# Patient Record
Sex: MALE | Race: BLACK OR AFRICAN AMERICAN | NOT HISPANIC OR LATINO | Employment: PART TIME | URBAN - METROPOLITAN AREA
[De-identification: names, ages, dates, MRNs, and addresses within clinical notes are randomized per-mention and may not be internally consistent; named-entity substitution may affect disease eponyms.]

---

## 2024-10-22 ENCOUNTER — HOSPITAL ENCOUNTER (OUTPATIENT)
Facility: HOSPITAL | Age: 24
Discharge: HOME | End: 2024-10-24
Attending: EMERGENCY MEDICINE | Admitting: INTERNAL MEDICINE
Payer: MEDICAID

## 2024-10-22 DIAGNOSIS — S66.921A HAND LACERATION INVOLVING TENDON, RIGHT, INITIAL ENCOUNTER: ICD-10-CM

## 2024-10-22 DIAGNOSIS — T14.90XA TRAUMA: ICD-10-CM

## 2024-10-22 DIAGNOSIS — M25.572 LEFT ANKLE PAIN: ICD-10-CM

## 2024-10-22 DIAGNOSIS — T14.8XXA OPEN FRACTURE: ICD-10-CM

## 2024-10-22 DIAGNOSIS — S62.231B: Primary | ICD-10-CM

## 2024-10-22 DIAGNOSIS — S61.411A HAND LACERATION INVOLVING TENDON, RIGHT, INITIAL ENCOUNTER: ICD-10-CM

## 2024-10-22 DIAGNOSIS — M25.512 LEFT SHOULDER PAIN: ICD-10-CM

## 2024-10-22 DIAGNOSIS — S92.255A CLOSED NONDISPLACED FRACTURE OF NAVICULAR BONE OF LEFT FOOT, INITIAL ENCOUNTER: ICD-10-CM

## 2024-10-22 DIAGNOSIS — T07.XXXA ABRASIONS OF MULTIPLE SITES: ICD-10-CM

## 2024-10-22 LAB
ABO + RH BLD: NORMAL
ALBUMIN SERPL BCP-MCNC: 4.2 G/DL (ref 3.5–5.2)
ALP SERPL-CCNC: 135 U/L (ref 40–150)
ALT SERPL W/O P-5'-P-CCNC: 33 U/L (ref 10–44)
ANION GAP SERPL CALC-SCNC: 10 MMOL/L (ref 8–16)
AST SERPL-CCNC: 27 U/L (ref 10–40)
BASOPHILS # BLD AUTO: 0.02 K/UL (ref 0–0.2)
BASOPHILS NFR BLD: 0.1 % (ref 0–1.9)
BILIRUB SERPL-MCNC: 0.5 MG/DL (ref 0.1–1)
BILIRUB UR QL STRIP: NEGATIVE
BLD GP AB SCN CELLS X3 SERPL QL: NORMAL
BUN SERPL-MCNC: 11 MG/DL (ref 6–20)
CALCIUM SERPL-MCNC: 9.7 MG/DL (ref 8.7–10.5)
CHLORIDE SERPL-SCNC: 104 MMOL/L (ref 95–110)
CLARITY UR: CLEAR
CO2 SERPL-SCNC: 25 MMOL/L (ref 23–29)
COLOR UR: YELLOW
CREAT SERPL-MCNC: 0.9 MG/DL (ref 0.5–1.4)
DIFFERENTIAL METHOD BLD: ABNORMAL
EOSINOPHIL # BLD AUTO: 0.1 K/UL (ref 0–0.5)
EOSINOPHIL NFR BLD: 0.5 % (ref 0–8)
ERYTHROCYTE [DISTWIDTH] IN BLOOD BY AUTOMATED COUNT: 13.7 % (ref 11.5–14.5)
EST. GFR  (NO RACE VARIABLE): >60 ML/MIN/1.73 M^2
ETHANOL SERPL-MCNC: <10 MG/DL
GLUCOSE SERPL-MCNC: 88 MG/DL (ref 70–110)
GLUCOSE UR QL STRIP: NEGATIVE
HCT VFR BLD AUTO: 42.7 % (ref 40–54)
HGB BLD-MCNC: 14.4 G/DL (ref 14–18)
HGB UR QL STRIP: NEGATIVE
IMM GRANULOCYTES # BLD AUTO: 0.08 K/UL (ref 0–0.04)
IMM GRANULOCYTES NFR BLD AUTO: 0.5 % (ref 0–0.5)
INR PPP: 1 (ref 0.8–1.2)
KETONES UR QL STRIP: NEGATIVE
LACTATE SERPL-SCNC: 2.2 MMOL/L (ref 0.5–2.2)
LEUKOCYTE ESTERASE UR QL STRIP: NEGATIVE
LYMPHOCYTES # BLD AUTO: 1.3 K/UL (ref 1–4.8)
LYMPHOCYTES NFR BLD: 7.7 % (ref 18–48)
MCH RBC QN AUTO: 30.2 PG (ref 27–31)
MCHC RBC AUTO-ENTMCNC: 33.7 G/DL (ref 32–36)
MCV RBC AUTO: 90 FL (ref 82–98)
MONOCYTES # BLD AUTO: 1.1 K/UL (ref 0.3–1)
MONOCYTES NFR BLD: 6.4 % (ref 4–15)
NEUTROPHILS # BLD AUTO: 14.8 K/UL (ref 1.8–7.7)
NEUTROPHILS NFR BLD: 84.8 % (ref 38–73)
NITRITE UR QL STRIP: NEGATIVE
NRBC BLD-RTO: 0 /100 WBC
PH UR STRIP: 8 [PH] (ref 5–8)
PLATELET # BLD AUTO: 246 K/UL (ref 150–450)
PMV BLD AUTO: 11.2 FL (ref 9.2–12.9)
POTASSIUM SERPL-SCNC: 4 MMOL/L (ref 3.5–5.1)
PROT SERPL-MCNC: 7.5 G/DL (ref 6–8.4)
PROT UR QL STRIP: ABNORMAL
PROTHROMBIN TIME: 10.7 SEC (ref 9–12.5)
RBC # BLD AUTO: 4.77 M/UL (ref 4.6–6.2)
SODIUM SERPL-SCNC: 139 MMOL/L (ref 136–145)
SP GR UR STRIP: >1.03 (ref 1–1.03)
SPECIMEN OUTDATE: NORMAL
URN SPEC COLLECT METH UR: ABNORMAL
UROBILINOGEN UR STRIP-ACNC: NEGATIVE EU/DL
WBC # BLD AUTO: 17.39 K/UL (ref 3.9–12.7)

## 2024-10-22 PROCEDURE — G0378 HOSPITAL OBSERVATION PER HR: HCPCS

## 2024-10-22 PROCEDURE — 99285 EMERGENCY DEPT VISIT HI MDM: CPT | Mod: 25

## 2024-10-22 PROCEDURE — 85610 PROTHROMBIN TIME: CPT | Performed by: EMERGENCY MEDICINE

## 2024-10-22 PROCEDURE — 93010 ELECTROCARDIOGRAM REPORT: CPT | Mod: ,,, | Performed by: INTERNAL MEDICINE

## 2024-10-22 PROCEDURE — 85025 COMPLETE CBC W/AUTO DIFF WBC: CPT | Performed by: EMERGENCY MEDICINE

## 2024-10-22 PROCEDURE — 96374 THER/PROPH/DIAG INJ IV PUSH: CPT | Mod: 59

## 2024-10-22 PROCEDURE — 80053 COMPREHEN METABOLIC PANEL: CPT | Performed by: EMERGENCY MEDICINE

## 2024-10-22 PROCEDURE — 86900 BLOOD TYPING SEROLOGIC ABO: CPT | Performed by: EMERGENCY MEDICINE

## 2024-10-22 PROCEDURE — 93005 ELECTROCARDIOGRAM TRACING: CPT

## 2024-10-22 PROCEDURE — 25500020 PHARM REV CODE 255: Performed by: EMERGENCY MEDICINE

## 2024-10-22 PROCEDURE — 36415 COLL VENOUS BLD VENIPUNCTURE: CPT | Performed by: EMERGENCY MEDICINE

## 2024-10-22 PROCEDURE — 83605 ASSAY OF LACTIC ACID: CPT | Performed by: EMERGENCY MEDICINE

## 2024-10-22 PROCEDURE — 82077 ASSAY SPEC XCP UR&BREATH IA: CPT | Performed by: EMERGENCY MEDICINE

## 2024-10-22 PROCEDURE — 96375 TX/PRO/DX INJ NEW DRUG ADDON: CPT | Mod: 59

## 2024-10-22 PROCEDURE — 81003 URINALYSIS AUTO W/O SCOPE: CPT | Performed by: EMERGENCY MEDICINE

## 2024-10-22 PROCEDURE — 86850 RBC ANTIBODY SCREEN: CPT | Performed by: EMERGENCY MEDICINE

## 2024-10-22 PROCEDURE — 29515 APPLICATION SHORT LEG SPLINT: CPT | Mod: LT

## 2024-10-22 PROCEDURE — 25000003 PHARM REV CODE 250: Performed by: EMERGENCY MEDICINE

## 2024-10-22 PROCEDURE — 63600175 PHARM REV CODE 636 W HCPCS: Performed by: EMERGENCY MEDICINE

## 2024-10-22 PROCEDURE — 29125 APPL SHORT ARM SPLINT STATIC: CPT | Mod: RT

## 2024-10-22 PROCEDURE — 86901 BLOOD TYPING SEROLOGIC RH(D): CPT | Performed by: EMERGENCY MEDICINE

## 2024-10-22 RX ORDER — ZOLPIDEM TARTRATE 5 MG/1
5 TABLET ORAL NIGHTLY PRN
COMMUNITY
Start: 2024-09-30

## 2024-10-22 RX ORDER — OXYCODONE AND ACETAMINOPHEN 10; 325 MG/1; MG/1
1 TABLET ORAL
Status: COMPLETED | OUTPATIENT
Start: 2024-10-22 | End: 2024-10-22

## 2024-10-22 RX ORDER — OXYCODONE AND ACETAMINOPHEN 10; 325 MG/1; MG/1
1 TABLET ORAL EVERY 4 HOURS PRN
Status: DISCONTINUED | OUTPATIENT
Start: 2024-10-22 | End: 2024-10-24 | Stop reason: HOSPADM

## 2024-10-22 RX ORDER — CEFAZOLIN 2 G/1
2 INJECTION, POWDER, FOR SOLUTION INTRAMUSCULAR; INTRAVENOUS ONCE
Status: COMPLETED | OUTPATIENT
Start: 2024-10-22 | End: 2024-10-22

## 2024-10-22 RX ORDER — MORPHINE SULFATE 4 MG/ML
2 INJECTION, SOLUTION INTRAMUSCULAR; INTRAVENOUS EVERY 4 HOURS PRN
Status: DISCONTINUED | OUTPATIENT
Start: 2024-10-22 | End: 2024-10-24 | Stop reason: HOSPADM

## 2024-10-22 RX ORDER — ONDANSETRON HYDROCHLORIDE 2 MG/ML
4 INJECTION, SOLUTION INTRAVENOUS
Status: COMPLETED | OUTPATIENT
Start: 2024-10-22 | End: 2024-10-22

## 2024-10-22 RX ORDER — LIDOCAINE HYDROCHLORIDE 10 MG/ML
10 INJECTION, SOLUTION EPIDURAL; INFILTRATION; INTRACAUDAL; PERINEURAL
Status: DISPENSED | OUTPATIENT
Start: 2024-10-22 | End: 2024-10-23

## 2024-10-22 RX ORDER — FENTANYL CITRATE 50 UG/ML
100 INJECTION, SOLUTION INTRAMUSCULAR; INTRAVENOUS
Status: COMPLETED | OUTPATIENT
Start: 2024-10-22 | End: 2024-10-22

## 2024-10-22 RX ADMIN — ONDANSETRON 4 MG: 2 INJECTION INTRAMUSCULAR; INTRAVENOUS at 05:10

## 2024-10-22 RX ADMIN — FENTANYL CITRATE 100 MCG: 50 INJECTION INTRAMUSCULAR; INTRAVENOUS at 05:10

## 2024-10-22 RX ADMIN — OXYCODONE AND ACETAMINOPHEN 1 TABLET: 10; 325 TABLET ORAL at 09:10

## 2024-10-22 RX ADMIN — CEFAZOLIN 2 G: 2 INJECTION, POWDER, FOR SOLUTION INTRAMUSCULAR; INTRAVENOUS at 08:10

## 2024-10-22 RX ADMIN — IOHEXOL 100 ML: 350 INJECTION, SOLUTION INTRAVENOUS at 06:10

## 2024-10-23 PROBLEM — G89.11 ACUTE PAIN DUE TO TRAUMA: Status: ACTIVE | Noted: 2024-10-23

## 2024-10-23 PROBLEM — E87.6 HYPOKALEMIA: Status: ACTIVE | Noted: 2024-10-23

## 2024-10-23 PROBLEM — S62.201B: Status: ACTIVE | Noted: 2024-10-23

## 2024-10-23 PROBLEM — S92.255A CLOSED NONDISPLACED FRACTURE OF NAVICULAR BONE OF LEFT FOOT: Status: ACTIVE | Noted: 2024-10-23

## 2024-10-23 PROBLEM — D72.829 LEUKOCYTOSIS: Status: ACTIVE | Noted: 2024-10-23

## 2024-10-23 LAB
ALBUMIN SERPL BCP-MCNC: 3.6 G/DL (ref 3.5–5.2)
ALP SERPL-CCNC: 110 U/L (ref 40–150)
ALT SERPL W/O P-5'-P-CCNC: 27 U/L (ref 10–44)
ANION GAP SERPL CALC-SCNC: 8 MMOL/L (ref 8–16)
AST SERPL-CCNC: 21 U/L (ref 10–40)
BASOPHILS # BLD AUTO: 0.02 K/UL (ref 0–0.2)
BASOPHILS NFR BLD: 0.2 % (ref 0–1.9)
BILIRUB SERPL-MCNC: 0.6 MG/DL (ref 0.1–1)
BUN SERPL-MCNC: 9 MG/DL (ref 6–20)
CALCIUM SERPL-MCNC: 8.7 MG/DL (ref 8.7–10.5)
CHLORIDE SERPL-SCNC: 101 MMOL/L (ref 95–110)
CO2 SERPL-SCNC: 29 MMOL/L (ref 23–29)
CREAT SERPL-MCNC: 0.8 MG/DL (ref 0.5–1.4)
DIFFERENTIAL METHOD BLD: ABNORMAL
EOSINOPHIL # BLD AUTO: 0.1 K/UL (ref 0–0.5)
EOSINOPHIL NFR BLD: 0.6 % (ref 0–8)
ERYTHROCYTE [DISTWIDTH] IN BLOOD BY AUTOMATED COUNT: 13.8 % (ref 11.5–14.5)
EST. GFR  (NO RACE VARIABLE): >60 ML/MIN/1.73 M^2
GLUCOSE SERPL-MCNC: 96 MG/DL (ref 70–110)
HCT VFR BLD AUTO: 41.5 % (ref 40–54)
HGB BLD-MCNC: 13.8 G/DL (ref 14–18)
IMM GRANULOCYTES # BLD AUTO: 0.06 K/UL (ref 0–0.04)
IMM GRANULOCYTES NFR BLD AUTO: 0.5 % (ref 0–0.5)
LYMPHOCYTES # BLD AUTO: 1.6 K/UL (ref 1–4.8)
LYMPHOCYTES NFR BLD: 12.7 % (ref 18–48)
MCH RBC QN AUTO: 29.9 PG (ref 27–31)
MCHC RBC AUTO-ENTMCNC: 33.3 G/DL (ref 32–36)
MCV RBC AUTO: 90 FL (ref 82–98)
MONOCYTES # BLD AUTO: 1.4 K/UL (ref 0.3–1)
MONOCYTES NFR BLD: 10.8 % (ref 4–15)
NEUTROPHILS # BLD AUTO: 9.6 K/UL (ref 1.8–7.7)
NEUTROPHILS NFR BLD: 75.2 % (ref 38–73)
NRBC BLD-RTO: 0 /100 WBC
PLATELET # BLD AUTO: 206 K/UL (ref 150–450)
PMV BLD AUTO: 11 FL (ref 9.2–12.9)
POTASSIUM SERPL-SCNC: 3.3 MMOL/L (ref 3.5–5.1)
PROT SERPL-MCNC: 6.6 G/DL (ref 6–8.4)
RBC # BLD AUTO: 4.62 M/UL (ref 4.6–6.2)
SODIUM SERPL-SCNC: 138 MMOL/L (ref 136–145)
WBC # BLD AUTO: 12.7 K/UL (ref 3.9–12.7)

## 2024-10-23 PROCEDURE — 25000003 PHARM REV CODE 250: Performed by: NURSE PRACTITIONER

## 2024-10-23 PROCEDURE — G0378 HOSPITAL OBSERVATION PER HR: HCPCS

## 2024-10-23 PROCEDURE — 25000003 PHARM REV CODE 250: Performed by: INTERNAL MEDICINE

## 2024-10-23 PROCEDURE — 85025 COMPLETE CBC W/AUTO DIFF WBC: CPT | Performed by: NURSE PRACTITIONER

## 2024-10-23 PROCEDURE — 99900035 HC TECH TIME PER 15 MIN (STAT)

## 2024-10-23 PROCEDURE — 63600175 PHARM REV CODE 636 W HCPCS: Performed by: INTERNAL MEDICINE

## 2024-10-23 PROCEDURE — 99204 OFFICE O/P NEW MOD 45 MIN: CPT | Mod: 57,,, | Performed by: PHYSICIAN ASSISTANT

## 2024-10-23 PROCEDURE — A4216 STERILE WATER/SALINE, 10 ML: HCPCS | Performed by: NURSE PRACTITIONER

## 2024-10-23 PROCEDURE — 96376 TX/PRO/DX INJ SAME DRUG ADON: CPT

## 2024-10-23 PROCEDURE — 36415 COLL VENOUS BLD VENIPUNCTURE: CPT | Performed by: NURSE PRACTITIONER

## 2024-10-23 PROCEDURE — 63600175 PHARM REV CODE 636 W HCPCS: Performed by: NURSE PRACTITIONER

## 2024-10-23 PROCEDURE — 94799 UNLISTED PULMONARY SVC/PX: CPT

## 2024-10-23 PROCEDURE — 80053 COMPREHEN METABOLIC PANEL: CPT | Performed by: NURSE PRACTITIONER

## 2024-10-23 PROCEDURE — 96375 TX/PRO/DX INJ NEW DRUG ADDON: CPT

## 2024-10-23 RX ORDER — ONDANSETRON HYDROCHLORIDE 2 MG/ML
4 INJECTION, SOLUTION INTRAVENOUS EVERY 8 HOURS PRN
Status: DISCONTINUED | OUTPATIENT
Start: 2024-10-23 | End: 2024-10-24 | Stop reason: HOSPADM

## 2024-10-23 RX ORDER — IBUPROFEN 200 MG
16 TABLET ORAL
Status: DISCONTINUED | OUTPATIENT
Start: 2024-10-23 | End: 2024-10-24 | Stop reason: HOSPADM

## 2024-10-23 RX ORDER — SODIUM CHLORIDE 0.9 % (FLUSH) 0.9 %
10 SYRINGE (ML) INJECTION EVERY 8 HOURS
Status: DISCONTINUED | OUTPATIENT
Start: 2024-10-23 | End: 2024-10-24 | Stop reason: HOSPADM

## 2024-10-23 RX ORDER — IBUPROFEN 200 MG
24 TABLET ORAL
Status: DISCONTINUED | OUTPATIENT
Start: 2024-10-23 | End: 2024-10-24 | Stop reason: HOSPADM

## 2024-10-23 RX ORDER — ACETAMINOPHEN 325 MG/1
650 TABLET ORAL EVERY 6 HOURS PRN
Status: DISCONTINUED | OUTPATIENT
Start: 2024-10-23 | End: 2024-10-24 | Stop reason: HOSPADM

## 2024-10-23 RX ORDER — GLUCAGON 1 MG
1 KIT INJECTION
Status: DISCONTINUED | OUTPATIENT
Start: 2024-10-23 | End: 2024-10-24 | Stop reason: HOSPADM

## 2024-10-23 RX ORDER — POLYETHYLENE GLYCOL 3350 17 G/17G
17 POWDER, FOR SOLUTION ORAL DAILY PRN
Status: DISCONTINUED | OUTPATIENT
Start: 2024-10-23 | End: 2024-10-24 | Stop reason: HOSPADM

## 2024-10-23 RX ORDER — NALOXONE HCL 0.4 MG/ML
0.02 VIAL (ML) INJECTION
Status: DISCONTINUED | OUTPATIENT
Start: 2024-10-23 | End: 2024-10-24 | Stop reason: HOSPADM

## 2024-10-23 RX ORDER — ALUMINUM HYDROXIDE, MAGNESIUM HYDROXIDE, AND SIMETHICONE 1200; 120; 1200 MG/30ML; MG/30ML; MG/30ML
30 SUSPENSION ORAL 4 TIMES DAILY PRN
Status: DISCONTINUED | OUTPATIENT
Start: 2024-10-23 | End: 2024-10-24 | Stop reason: HOSPADM

## 2024-10-23 RX ORDER — POTASSIUM CHLORIDE 750 MG/1
30 TABLET, EXTENDED RELEASE ORAL ONCE
Status: COMPLETED | OUTPATIENT
Start: 2024-10-23 | End: 2024-10-23

## 2024-10-23 RX ORDER — PROMETHAZINE HYDROCHLORIDE 25 MG/1
25 TABLET ORAL EVERY 6 HOURS PRN
Status: DISCONTINUED | OUTPATIENT
Start: 2024-10-23 | End: 2024-10-24 | Stop reason: HOSPADM

## 2024-10-23 RX ORDER — CEFAZOLIN 2 G/1
2 INJECTION, POWDER, FOR SOLUTION INTRAMUSCULAR; INTRAVENOUS
Status: DISCONTINUED | OUTPATIENT
Start: 2024-10-23 | End: 2024-10-24 | Stop reason: HOSPADM

## 2024-10-23 RX ORDER — TALC
6 POWDER (GRAM) TOPICAL NIGHTLY PRN
Status: DISCONTINUED | OUTPATIENT
Start: 2024-10-23 | End: 2024-10-24 | Stop reason: HOSPADM

## 2024-10-23 RX ADMIN — CEFAZOLIN 2 G: 2 INJECTION, POWDER, FOR SOLUTION INTRAMUSCULAR; INTRAVENOUS at 04:10

## 2024-10-23 RX ADMIN — CEFAZOLIN 2 G: 2 INJECTION, POWDER, FOR SOLUTION INTRAMUSCULAR; INTRAVENOUS at 01:10

## 2024-10-23 RX ADMIN — OXYCODONE AND ACETAMINOPHEN 1 TABLET: 10; 325 TABLET ORAL at 08:10

## 2024-10-23 RX ADMIN — Medication 10 ML: at 09:10

## 2024-10-23 RX ADMIN — CEFAZOLIN 2 G: 2 INJECTION, POWDER, FOR SOLUTION INTRAMUSCULAR; INTRAVENOUS at 09:10

## 2024-10-23 RX ADMIN — OXYCODONE AND ACETAMINOPHEN 1 TABLET: 10; 325 TABLET ORAL at 01:10

## 2024-10-23 RX ADMIN — MORPHINE SULFATE 2 MG: 4 INJECTION INTRAVENOUS at 05:10

## 2024-10-23 RX ADMIN — Medication 10 ML: at 05:10

## 2024-10-23 RX ADMIN — OXYCODONE AND ACETAMINOPHEN 1 TABLET: 10; 325 TABLET ORAL at 09:10

## 2024-10-23 RX ADMIN — Medication 10 ML: at 01:10

## 2024-10-23 RX ADMIN — MORPHINE SULFATE 2 MG: 4 INJECTION INTRAVENOUS at 01:10

## 2024-10-23 RX ADMIN — OXYCODONE AND ACETAMINOPHEN 1 TABLET: 10; 325 TABLET ORAL at 05:10

## 2024-10-23 RX ADMIN — POTASSIUM CHLORIDE 30 MEQ: 750 TABLET, EXTENDED RELEASE ORAL at 08:10

## 2024-10-24 ENCOUNTER — ANESTHESIA EVENT (OUTPATIENT)
Dept: SURGERY | Facility: HOSPITAL | Age: 24
End: 2024-10-24
Payer: MEDICAID

## 2024-10-24 ENCOUNTER — NURSE TRIAGE (OUTPATIENT)
Dept: ADMINISTRATIVE | Facility: CLINIC | Age: 24
End: 2024-10-24
Payer: MEDICAID

## 2024-10-24 ENCOUNTER — ANESTHESIA (OUTPATIENT)
Dept: SURGERY | Facility: HOSPITAL | Age: 24
End: 2024-10-24
Payer: MEDICAID

## 2024-10-24 VITALS
OXYGEN SATURATION: 97 % | HEIGHT: 68 IN | WEIGHT: 219.13 LBS | BODY MASS INDEX: 33.21 KG/M2 | SYSTOLIC BLOOD PRESSURE: 159 MMHG | HEART RATE: 70 BPM | DIASTOLIC BLOOD PRESSURE: 90 MMHG | RESPIRATION RATE: 15 BRPM | TEMPERATURE: 98 F

## 2024-10-24 LAB
ALBUMIN SERPL BCP-MCNC: 3.5 G/DL (ref 3.5–5.2)
ALP SERPL-CCNC: 98 U/L (ref 40–150)
ALT SERPL W/O P-5'-P-CCNC: 22 U/L (ref 10–44)
ANION GAP SERPL CALC-SCNC: 11 MMOL/L (ref 8–16)
AST SERPL-CCNC: 19 U/L (ref 10–40)
BASOPHILS # BLD AUTO: 0.03 K/UL (ref 0–0.2)
BASOPHILS NFR BLD: 0.3 % (ref 0–1.9)
BILIRUB SERPL-MCNC: 0.8 MG/DL (ref 0.1–1)
BUN SERPL-MCNC: 9 MG/DL (ref 6–20)
CALCIUM SERPL-MCNC: 9 MG/DL (ref 8.7–10.5)
CHLORIDE SERPL-SCNC: 101 MMOL/L (ref 95–110)
CO2 SERPL-SCNC: 26 MMOL/L (ref 23–29)
CREAT SERPL-MCNC: 0.8 MG/DL (ref 0.5–1.4)
DIFFERENTIAL METHOD BLD: ABNORMAL
EOSINOPHIL # BLD AUTO: 0.1 K/UL (ref 0–0.5)
EOSINOPHIL NFR BLD: 1 % (ref 0–8)
ERYTHROCYTE [DISTWIDTH] IN BLOOD BY AUTOMATED COUNT: 13.7 % (ref 11.5–14.5)
EST. GFR  (NO RACE VARIABLE): >60 ML/MIN/1.73 M^2
GLUCOSE SERPL-MCNC: 96 MG/DL (ref 70–110)
HCT VFR BLD AUTO: 42.2 % (ref 40–54)
HGB BLD-MCNC: 13.8 G/DL (ref 14–18)
IMM GRANULOCYTES # BLD AUTO: 0.05 K/UL (ref 0–0.04)
IMM GRANULOCYTES NFR BLD AUTO: 0.4 % (ref 0–0.5)
LYMPHOCYTES # BLD AUTO: 1 K/UL (ref 1–4.8)
LYMPHOCYTES NFR BLD: 8.9 % (ref 18–48)
MCH RBC QN AUTO: 29.3 PG (ref 27–31)
MCHC RBC AUTO-ENTMCNC: 32.7 G/DL (ref 32–36)
MCV RBC AUTO: 90 FL (ref 82–98)
MONOCYTES # BLD AUTO: 0.8 K/UL (ref 0.3–1)
MONOCYTES NFR BLD: 7.5 % (ref 4–15)
NEUTROPHILS # BLD AUTO: 9.1 K/UL (ref 1.8–7.7)
NEUTROPHILS NFR BLD: 81.9 % (ref 38–73)
NRBC BLD-RTO: 0 /100 WBC
OHS QRS DURATION: 84 MS
OHS QTC CALCULATION: 384 MS
PLATELET # BLD AUTO: 206 K/UL (ref 150–450)
PMV BLD AUTO: 10.9 FL (ref 9.2–12.9)
POTASSIUM SERPL-SCNC: 3.9 MMOL/L (ref 3.5–5.1)
PROT SERPL-MCNC: 6.7 G/DL (ref 6–8.4)
RBC # BLD AUTO: 4.71 M/UL (ref 4.6–6.2)
SODIUM SERPL-SCNC: 138 MMOL/L (ref 136–145)
WBC # BLD AUTO: 11.14 K/UL (ref 3.9–12.7)

## 2024-10-24 PROCEDURE — 28450 TX TARSAL B1 FX W/O MNPJ EA: CPT | Mod: 51,LT,, | Performed by: ORTHOPAEDIC SURGERY

## 2024-10-24 PROCEDURE — 25000003 PHARM REV CODE 250: Performed by: NURSE PRACTITIONER

## 2024-10-24 PROCEDURE — 63600175 PHARM REV CODE 636 W HCPCS: Mod: JZ,JG | Performed by: ORTHOPAEDIC SURGERY

## 2024-10-24 PROCEDURE — 96375 TX/PRO/DX INJ NEW DRUG ADDON: CPT

## 2024-10-24 PROCEDURE — 25000003 PHARM REV CODE 250: Performed by: INTERNAL MEDICINE

## 2024-10-24 PROCEDURE — 96376 TX/PRO/DX INJ SAME DRUG ADON: CPT

## 2024-10-24 PROCEDURE — A4216 STERILE WATER/SALINE, 10 ML: HCPCS | Performed by: NURSE PRACTITIONER

## 2024-10-24 PROCEDURE — 36000709 HC OR TIME LEV III EA ADD 15 MIN: Performed by: ORTHOPAEDIC SURGERY

## 2024-10-24 PROCEDURE — 63600175 PHARM REV CODE 636 W HCPCS: Performed by: NURSE PRACTITIONER

## 2024-10-24 PROCEDURE — 63600175 PHARM REV CODE 636 W HCPCS: Performed by: NURSE ANESTHETIST, CERTIFIED REGISTERED

## 2024-10-24 PROCEDURE — G0378 HOSPITAL OBSERVATION PER HR: HCPCS

## 2024-10-24 PROCEDURE — 25000003 PHARM REV CODE 250: Performed by: ANESTHESIOLOGY

## 2024-10-24 PROCEDURE — C1769 GUIDE WIRE: HCPCS | Performed by: ORTHOPAEDIC SURGERY

## 2024-10-24 PROCEDURE — 26665 TREAT THUMB FRACTURE: CPT | Mod: RT,,, | Performed by: ORTHOPAEDIC SURGERY

## 2024-10-24 PROCEDURE — 63600175 PHARM REV CODE 636 W HCPCS: Performed by: ANESTHESIOLOGY

## 2024-10-24 PROCEDURE — 80053 COMPREHEN METABOLIC PANEL: CPT | Performed by: NURSE PRACTITIONER

## 2024-10-24 PROCEDURE — 85025 COMPLETE CBC W/AUTO DIFF WBC: CPT | Performed by: NURSE PRACTITIONER

## 2024-10-24 PROCEDURE — 36415 COLL VENOUS BLD VENIPUNCTURE: CPT | Performed by: NURSE PRACTITIONER

## 2024-10-24 PROCEDURE — 36000708 HC OR TIME LEV III 1ST 15 MIN: Performed by: ORTHOPAEDIC SURGERY

## 2024-10-24 PROCEDURE — 25000003 PHARM REV CODE 250: Performed by: NURSE ANESTHETIST, CERTIFIED REGISTERED

## 2024-10-24 PROCEDURE — 37000009 HC ANESTHESIA EA ADD 15 MINS: Performed by: ORTHOPAEDIC SURGERY

## 2024-10-24 PROCEDURE — 37000008 HC ANESTHESIA 1ST 15 MINUTES: Performed by: ORTHOPAEDIC SURGERY

## 2024-10-24 PROCEDURE — 94799 UNLISTED PULMONARY SVC/PX: CPT | Mod: XB

## 2024-10-24 PROCEDURE — 71000033 HC RECOVERY, INTIAL HOUR: Performed by: ORTHOPAEDIC SURGERY

## 2024-10-24 RX ORDER — ONDANSETRON HYDROCHLORIDE 2 MG/ML
4 INJECTION, SOLUTION INTRAVENOUS ONCE AS NEEDED
Status: DISCONTINUED | OUTPATIENT
Start: 2024-10-24 | End: 2024-10-24 | Stop reason: HOSPADM

## 2024-10-24 RX ORDER — ROCURONIUM BROMIDE 10 MG/ML
INJECTION, SOLUTION INTRAVENOUS
Status: DISCONTINUED | OUTPATIENT
Start: 2024-10-24 | End: 2024-10-24

## 2024-10-24 RX ORDER — MEPERIDINE HYDROCHLORIDE 25 MG/ML
12.5 INJECTION INTRAMUSCULAR; INTRAVENOUS; SUBCUTANEOUS ONCE AS NEEDED
Status: DISCONTINUED | OUTPATIENT
Start: 2024-10-24 | End: 2024-10-24 | Stop reason: HOSPADM

## 2024-10-24 RX ORDER — DOXYCYCLINE 100 MG/1
100 CAPSULE ORAL EVERY 12 HOURS
Qty: 20 CAPSULE | Refills: 0 | Status: SHIPPED | OUTPATIENT
Start: 2024-10-24 | End: 2024-11-03

## 2024-10-24 RX ORDER — OXYCODONE AND ACETAMINOPHEN 5; 325 MG/1; MG/1
1 TABLET ORAL
Status: DISCONTINUED | OUTPATIENT
Start: 2024-10-24 | End: 2024-10-24 | Stop reason: HOSPADM

## 2024-10-24 RX ORDER — DEXAMETHASONE SODIUM PHOSPHATE 4 MG/ML
INJECTION, SOLUTION INTRA-ARTICULAR; INTRALESIONAL; INTRAMUSCULAR; INTRAVENOUS; SOFT TISSUE
Status: DISCONTINUED | OUTPATIENT
Start: 2024-10-24 | End: 2024-10-24

## 2024-10-24 RX ORDER — HYDROMORPHONE HYDROCHLORIDE 1 MG/ML
0.2 INJECTION, SOLUTION INTRAMUSCULAR; INTRAVENOUS; SUBCUTANEOUS EVERY 5 MIN PRN
Status: DISCONTINUED | OUTPATIENT
Start: 2024-10-24 | End: 2024-10-24 | Stop reason: HOSPADM

## 2024-10-24 RX ORDER — SUCCINYLCHOLINE CHLORIDE 20 MG/ML
INJECTION INTRAMUSCULAR; INTRAVENOUS
Status: DISCONTINUED | OUTPATIENT
Start: 2024-10-24 | End: 2024-10-24

## 2024-10-24 RX ORDER — OXYCODONE AND ACETAMINOPHEN 5; 325 MG/1; MG/1
1 TABLET ORAL ONCE
Status: COMPLETED | OUTPATIENT
Start: 2024-10-24 | End: 2024-10-24

## 2024-10-24 RX ORDER — OXYCODONE AND ACETAMINOPHEN 7.5; 325 MG/1; MG/1
1 TABLET ORAL EVERY 6 HOURS PRN
Qty: 28 TABLET | Refills: 0 | Status: SHIPPED | OUTPATIENT
Start: 2024-10-24 | End: 2024-10-31

## 2024-10-24 RX ORDER — HYDROMORPHONE HYDROCHLORIDE 2 MG/ML
0.5 INJECTION, SOLUTION INTRAMUSCULAR; INTRAVENOUS; SUBCUTANEOUS EVERY 5 MIN PRN
Status: DISCONTINUED | OUTPATIENT
Start: 2024-10-24 | End: 2024-10-24 | Stop reason: HOSPADM

## 2024-10-24 RX ORDER — FENTANYL CITRATE 50 UG/ML
25 INJECTION, SOLUTION INTRAMUSCULAR; INTRAVENOUS EVERY 5 MIN PRN
Status: DISCONTINUED | OUTPATIENT
Start: 2024-10-24 | End: 2024-10-24 | Stop reason: HOSPADM

## 2024-10-24 RX ORDER — ONDANSETRON HYDROCHLORIDE 2 MG/ML
INJECTION, SOLUTION INTRAVENOUS
Status: DISCONTINUED | OUTPATIENT
Start: 2024-10-24 | End: 2024-10-24

## 2024-10-24 RX ORDER — PROPOFOL 10 MG/ML
VIAL (ML) INTRAVENOUS
Status: DISCONTINUED | OUTPATIENT
Start: 2024-10-24 | End: 2024-10-24

## 2024-10-24 RX ORDER — BUPIVACAINE HYDROCHLORIDE 2.5 MG/ML
INJECTION, SOLUTION EPIDURAL; INFILTRATION; INTRACAUDAL
Status: DISCONTINUED | OUTPATIENT
Start: 2024-10-24 | End: 2024-10-24 | Stop reason: HOSPADM

## 2024-10-24 RX ORDER — FENTANYL CITRATE 50 UG/ML
INJECTION, SOLUTION INTRAMUSCULAR; INTRAVENOUS
Status: DISCONTINUED | OUTPATIENT
Start: 2024-10-24 | End: 2024-10-24

## 2024-10-24 RX ORDER — LIDOCAINE HYDROCHLORIDE 20 MG/ML
INJECTION INTRAVENOUS
Status: DISCONTINUED | OUTPATIENT
Start: 2024-10-24 | End: 2024-10-24

## 2024-10-24 RX ORDER — MIDAZOLAM HYDROCHLORIDE 1 MG/ML
INJECTION INTRAMUSCULAR; INTRAVENOUS
Status: DISCONTINUED | OUTPATIENT
Start: 2024-10-24 | End: 2024-10-24

## 2024-10-24 RX ORDER — MEPERIDINE HYDROCHLORIDE 25 MG/ML
12.5 INJECTION INTRAMUSCULAR; INTRAVENOUS; SUBCUTANEOUS ONCE
Status: COMPLETED | OUTPATIENT
Start: 2024-10-24 | End: 2024-10-24

## 2024-10-24 RX ORDER — ONDANSETRON HYDROCHLORIDE 2 MG/ML
4 INJECTION, SOLUTION INTRAVENOUS DAILY PRN
Status: DISCONTINUED | OUTPATIENT
Start: 2024-10-24 | End: 2024-10-24 | Stop reason: HOSPADM

## 2024-10-24 RX ADMIN — OXYCODONE AND ACETAMINOPHEN 1 TABLET: 10; 325 TABLET ORAL at 05:10

## 2024-10-24 RX ADMIN — CEFAZOLIN 2 G: 2 INJECTION, POWDER, FOR SOLUTION INTRAMUSCULAR; INTRAVENOUS at 03:10

## 2024-10-24 RX ADMIN — MEPERIDINE HYDROCHLORIDE 12.5 MG: 25 INJECTION INTRAMUSCULAR; INTRAVENOUS; SUBCUTANEOUS at 02:10

## 2024-10-24 RX ADMIN — ROCURONIUM BROMIDE 10 MG: 10 INJECTION, SOLUTION INTRAVENOUS at 12:10

## 2024-10-24 RX ADMIN — OXYCODONE AND ACETAMINOPHEN 1 TABLET: 10; 325 TABLET ORAL at 02:10

## 2024-10-24 RX ADMIN — OXYCODONE HYDROCHLORIDE AND ACETAMINOPHEN 1 TABLET: 5; 325 TABLET ORAL at 04:10

## 2024-10-24 RX ADMIN — Medication 10 ML: at 05:10

## 2024-10-24 RX ADMIN — SODIUM CHLORIDE, SODIUM LACTATE, POTASSIUM CHLORIDE, AND CALCIUM CHLORIDE: .6; .31; .03; .02 INJECTION, SOLUTION INTRAVENOUS at 12:10

## 2024-10-24 RX ADMIN — SUCCINYLCHOLINE CHLORIDE 120 MG: 20 INJECTION, SOLUTION INTRAMUSCULAR; INTRAVENOUS; PARENTERAL at 12:10

## 2024-10-24 RX ADMIN — ONDANSETRON 4 MG: 2 INJECTION INTRAMUSCULAR; INTRAVENOUS at 12:10

## 2024-10-24 RX ADMIN — OXYCODONE AND ACETAMINOPHEN 1 TABLET: 10; 325 TABLET ORAL at 01:10

## 2024-10-24 RX ADMIN — MIDAZOLAM HYDROCHLORIDE 2 MG: 1 INJECTION, SOLUTION INTRAMUSCULAR; INTRAVENOUS at 12:10

## 2024-10-24 RX ADMIN — DEXAMETHASONE SODIUM PHOSPHATE 4 MG: 4 INJECTION, SOLUTION INTRA-ARTICULAR; INTRALESIONAL; INTRAMUSCULAR; INTRAVENOUS; SOFT TISSUE at 12:10

## 2024-10-24 RX ADMIN — FENTANYL CITRATE 100 MCG: 50 INJECTION, SOLUTION INTRAMUSCULAR; INTRAVENOUS at 12:10

## 2024-10-24 RX ADMIN — LIDOCAINE HYDROCHLORIDE 50 MG: 20 INJECTION INTRAVENOUS at 12:10

## 2024-10-24 RX ADMIN — PROPOFOL 200 MG: 10 INJECTION, EMULSION INTRAVENOUS at 12:10

## 2024-10-24 RX ADMIN — FENTANYL CITRATE 50 MCG: 50 INJECTION, SOLUTION INTRAMUSCULAR; INTRAVENOUS at 01:10

## 2024-10-24 RX ADMIN — FENTANYL CITRATE 50 MCG: 50 INJECTION, SOLUTION INTRAMUSCULAR; INTRAVENOUS at 12:10

## 2024-10-24 NOTE — TELEPHONE ENCOUNTER
[place]    PCP:  None    S/P:     ORIF, FRACTURE, METACARPAL BONE Right General   with pinning     REPLACEMENT, DRESSING, WOUND Left General   REPLACEMENT, DRESSING Left General   added a boot.       today with Dr. Woody Perales.  Wife is calling for work excuse for pt and herself (until Tuesday for her).  Per protocol, care advised is call Surgeon when office is open.  Wife VU.  Advised to call for worsening/questions/concerns.  VU.    Reason for Disposition   [1] Caller requesting NON-URGENT health information AND [2] PCP's office is the best resource    Additional Information   Negative: Triager needs further essential information from caller in order to complete triage    Protocols used: Information Only Call - No Triage-A-

## 2024-10-24 NOTE — TRANSFER OF CARE
"Anesthesia Transfer of Care Note    Patient: Paul Leal    Procedure(s) Performed: Procedure(s) (LRB):  ORIF, FRACTURE, METACARPAL BONE (Right)  REPLACEMENT, DRESSING, WOUND (Left)  REPLACEMENT, DRESSING (Left)    Patient location: PACU    Anesthesia Type: general    Transport from OR: Transported from OR on room air with adequate spontaneous ventilation    Post pain: adequate analgesia    Post assessment: no apparent anesthetic complications    Post vital signs: stable    Level of consciousness: responds to stimulation    Nausea/Vomiting: no nausea/vomiting    Complications: none    Transfer of care protocol was followed      Last vitals: Visit Vitals  /63 (BP Location: Right arm, Patient Position: Sitting)   Pulse 73   Temp 36.8 °C (98.3 °F) (Oral)   Resp 18   Ht 5' 8" (1.727 m)   Wt 99.4 kg (219 lb 2.2 oz)   SpO2 100%   BMI 33.32 kg/m²     "

## 2024-10-24 NOTE — ANESTHESIA PROCEDURE NOTES
Intubation    Date/Time: 10/24/2024 12:21 PM    Performed by: Jordon Lamb CRNA  Authorized by: Joshua Adkins MD    Intubation:     Induction:  Intravenous    Intubated:  Postinduction    Mask Ventilation:  Easy mask    Attempts:  1    Attempted By:  CRNA    Method of Intubation:  Direct    Blade:  De La Garza 2    Laryngeal View Grade: Grade I - full view of cords      Difficult Airway Encountered?: No      Complications:  None    Airway Device:  Oral endotracheal tube    Airway Device Size:  7.5    Style/Cuff Inflation:  Cuffed (inflated to minimal occlusive pressure)    Tube secured:  22    Secured at:  The lips    Placement Verified By:  Capnometry    Complicating Factors:  None    Findings Post-Intubation:  BS equal bilateral
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
## 2024-10-25 ENCOUNTER — TELEPHONE (OUTPATIENT)
Dept: ORTHOPEDICS | Facility: CLINIC | Age: 24
End: 2024-10-25
Payer: MEDICAID

## 2024-10-25 NOTE — TELEPHONE ENCOUNTER
Attempted to call both of the phone numbers in the patents chart several times an there was no answer on one an one was disconnected.    
no

## 2024-10-29 ENCOUNTER — TELEPHONE (OUTPATIENT)
Dept: ORTHOPEDICS | Facility: CLINIC | Age: 24
End: 2024-10-29
Payer: MEDICAID

## 2024-10-29 ENCOUNTER — OFFICE VISIT (OUTPATIENT)
Dept: ORTHOPEDICS | Facility: CLINIC | Age: 24
End: 2024-10-29
Payer: MEDICAID

## 2024-10-29 VITALS
BODY MASS INDEX: 33.21 KG/M2 | HEART RATE: 65 BPM | SYSTOLIC BLOOD PRESSURE: 120 MMHG | TEMPERATURE: 98 F | DIASTOLIC BLOOD PRESSURE: 69 MMHG | HEIGHT: 68 IN | WEIGHT: 219.13 LBS

## 2024-10-29 DIAGNOSIS — S62.231B: ICD-10-CM

## 2024-10-29 DIAGNOSIS — S92.255A CLOSED NONDISPLACED FRACTURE OF NAVICULAR BONE OF LEFT FOOT, INITIAL ENCOUNTER: ICD-10-CM

## 2024-10-29 PROCEDURE — 1159F MED LIST DOCD IN RCRD: CPT | Mod: CPTII,,, | Performed by: PHYSICIAN ASSISTANT

## 2024-10-29 PROCEDURE — 99999 PR PBB SHADOW E&M-EST. PATIENT-LVL III: CPT | Mod: PBBFAC,,, | Performed by: PHYSICIAN ASSISTANT

## 2024-10-29 PROCEDURE — 3078F DIAST BP <80 MM HG: CPT | Mod: CPTII,,, | Performed by: PHYSICIAN ASSISTANT

## 2024-10-29 PROCEDURE — 99213 OFFICE O/P EST LOW 20 MIN: CPT | Mod: PBBFAC | Performed by: PHYSICIAN ASSISTANT

## 2024-10-29 PROCEDURE — 3074F SYST BP LT 130 MM HG: CPT | Mod: CPTII,,, | Performed by: PHYSICIAN ASSISTANT

## 2024-10-29 PROCEDURE — 99024 POSTOP FOLLOW-UP VISIT: CPT | Mod: ,,, | Performed by: PHYSICIAN ASSISTANT

## 2024-10-29 PROCEDURE — 1160F RVW MEDS BY RX/DR IN RCRD: CPT | Mod: CPTII,,, | Performed by: PHYSICIAN ASSISTANT

## 2024-10-29 RX ORDER — OXYCODONE AND ACETAMINOPHEN 10; 325 MG/1; MG/1
1 TABLET ORAL EVERY 8 HOURS PRN
Qty: 21 TABLET | Refills: 0 | Status: SHIPPED | OUTPATIENT
Start: 2024-10-31 | End: 2024-11-07

## 2024-11-05 ENCOUNTER — OFFICE VISIT (OUTPATIENT)
Dept: ORTHOPEDICS | Facility: CLINIC | Age: 24
End: 2024-11-05
Payer: MEDICAID

## 2024-11-05 VITALS
WEIGHT: 219.13 LBS | SYSTOLIC BLOOD PRESSURE: 107 MMHG | HEART RATE: 69 BPM | HEIGHT: 68 IN | BODY MASS INDEX: 33.21 KG/M2 | DIASTOLIC BLOOD PRESSURE: 61 MMHG

## 2024-11-05 DIAGNOSIS — S62.231D OPEN DISPLACED FRACTURE OF BASE OF FIRST METACARPAL BONE OF RIGHT HAND WITH ROUTINE HEALING, UNSPECIFIED FRACTURE MORPHOLOGY, SUBSEQUENT ENCOUNTER: Primary | ICD-10-CM

## 2024-11-05 DIAGNOSIS — M25.512 ACUTE PAIN OF LEFT SHOULDER DUE TO TRAUMA: ICD-10-CM

## 2024-11-05 DIAGNOSIS — G89.11 ACUTE PAIN OF LEFT SHOULDER DUE TO TRAUMA: ICD-10-CM

## 2024-11-05 DIAGNOSIS — S92.255D CLOSED NONDISPLACED FRACTURE OF NAVICULAR BONE OF LEFT FOOT WITH ROUTINE HEALING, SUBSEQUENT ENCOUNTER: ICD-10-CM

## 2024-11-05 PROCEDURE — 1159F MED LIST DOCD IN RCRD: CPT | Mod: CPTII,,, | Performed by: PHYSICIAN ASSISTANT

## 2024-11-05 PROCEDURE — 3078F DIAST BP <80 MM HG: CPT | Mod: CPTII,,, | Performed by: PHYSICIAN ASSISTANT

## 2024-11-05 PROCEDURE — 99024 POSTOP FOLLOW-UP VISIT: CPT | Mod: ,,, | Performed by: PHYSICIAN ASSISTANT

## 2024-11-05 PROCEDURE — 99213 OFFICE O/P EST LOW 20 MIN: CPT | Mod: PBBFAC | Performed by: PHYSICIAN ASSISTANT

## 2024-11-05 PROCEDURE — 3074F SYST BP LT 130 MM HG: CPT | Mod: CPTII,,, | Performed by: PHYSICIAN ASSISTANT

## 2024-11-05 PROCEDURE — 99999 PR PBB SHADOW E&M-EST. PATIENT-LVL III: CPT | Mod: PBBFAC,,, | Performed by: PHYSICIAN ASSISTANT

## 2024-11-05 PROCEDURE — 1160F RVW MEDS BY RX/DR IN RCRD: CPT | Mod: CPTII,,, | Performed by: PHYSICIAN ASSISTANT

## 2024-11-05 RX ORDER — OXYCODONE AND ACETAMINOPHEN 10; 325 MG/1; MG/1
1 TABLET ORAL EVERY 8 HOURS PRN
Qty: 21 TABLET | Refills: 0 | Status: SHIPPED | OUTPATIENT
Start: 2024-11-07 | End: 2024-11-14

## 2024-11-05 NOTE — ANESTHESIA POSTPROCEDURE EVALUATION
Anesthesia Post Evaluation    Patient: Paul Leal    Procedure(s) Performed: Procedure(s) (LRB):  ORIF, FRACTURE, METACARPAL BONE (Right)  REPLACEMENT, DRESSING, WOUND (Left)  REPLACEMENT, DRESSING (Left)    Final Anesthesia Type: general      Patient location during evaluation: PACU  Patient participation: Yes- Able to Participate  Level of consciousness: awake and alert, oriented and awake  Post-procedure vital signs: reviewed and stable  Pain management: adequate  Airway patency: patent    PONV status at discharge: No PONV  Anesthetic complications: no      Cardiovascular status: blood pressure returned to baseline  Respiratory status: unassisted  Hydration status: euvolemic  Follow-up not needed.              Vitals Value Taken Time   /61 11/05/24 0907   Temp 36.5 °C (97.7 °F) 10/29/24 1109   Pulse 69 11/05/24 0907   Resp 15 10/24/24 1633   SpO2 97 % 10/24/24 1603         Event Time   Out of Recovery 10/24/2024 14:50:08         Pain/Dinesh Score: No data recorded
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
## 2024-11-06 ENCOUNTER — TELEPHONE (OUTPATIENT)
Dept: ORTHOPEDICS | Facility: CLINIC | Age: 24
End: 2024-11-06
Payer: MEDICAID

## 2024-11-06 NOTE — TELEPHONE ENCOUNTER
----- Message from Ailyn sent at 11/6/2024  7:05 AM CST -----  Contact: Paul  Patient is calling to speak with someone regarding prescription. Patient reports being seen with provider yesterday and request to confirm if prescription has been sent to pharmacy to retrieve prescription this morning. Please give patient a call back at [phone] to assist.   Thank you,  KEVIN

## 2024-11-06 NOTE — TELEPHONE ENCOUNTER
Spoke with patient and informed him that his RX request has been sent to his preferred pharmacy and the earliest day to be filled is on tomorrow 11/07/2024. Patient verbalized understanding

## 2024-11-13 DIAGNOSIS — S92.255D CLOSED NONDISPLACED FRACTURE OF NAVICULAR BONE OF LEFT FOOT WITH ROUTINE HEALING, SUBSEQUENT ENCOUNTER: ICD-10-CM

## 2024-11-13 DIAGNOSIS — G89.11 ACUTE PAIN OF LEFT SHOULDER DUE TO TRAUMA: ICD-10-CM

## 2024-11-13 DIAGNOSIS — S62.231D OPEN DISPLACED FRACTURE OF BASE OF FIRST METACARPAL BONE OF RIGHT HAND WITH ROUTINE HEALING, UNSPECIFIED FRACTURE MORPHOLOGY, SUBSEQUENT ENCOUNTER: ICD-10-CM

## 2024-11-13 DIAGNOSIS — M25.512 ACUTE PAIN OF LEFT SHOULDER DUE TO TRAUMA: ICD-10-CM

## 2024-11-13 NOTE — TELEPHONE ENCOUNTER
----- Message from Minor sent at 11/13/2024  3:47 PM CST -----  Contact: Sharla/ Wife  .Type:  RX Refill Request    Who Called:  Sharla   Refill or New Rx: Refill  RX Name and Strength: oxyCODONE-acetaminophen (PERCOCET)  mg per tablet  How is the patient currently taking it? (ex. 1XDay): Take 1 tablet by mouth every 8 (eight) hours as needed  Is this a 30 day or 90 day RX: 21 day   Preferred Pharmacy with phone number: [facility]/pharmacy #[id number] - [place] - [address] [facility]  [address] [place] [address]  Phone: [phone] Fax: [phone]  Local or Mail Order: Local   Ordering Provider: Lauro Morales   Would the patient rather a call back or a response via [facility]?  Call back   Best Call Back Number: [phone]  Additional Information:      Thanks

## 2024-11-14 ENCOUNTER — TELEPHONE (OUTPATIENT)
Dept: ORTHOPEDICS | Facility: CLINIC | Age: 24
End: 2024-11-14
Payer: MEDICAID

## 2024-11-14 RX ORDER — OXYCODONE AND ACETAMINOPHEN 10; 325 MG/1; MG/1
1 TABLET ORAL EVERY 8 HOURS PRN
Qty: 21 TABLET | Refills: 0 | Status: SHIPPED | OUTPATIENT
Start: 2024-11-14 | End: 2024-11-21

## 2024-11-14 NOTE — TELEPHONE ENCOUNTER
----- Message from Ying sent at 11/14/2024  9:22 AM CST -----  Contact: pt  Type:  RX Refill Request    Who Called:  pt  Refill or New Rx: refill  RX Name and Strength: oxyCODONE-acetaminophen (PERCOCET)  mg per tablet  How is the patient currently taking it? (ex. 1XDay):  Is this a 30 day or 90 day RX:  Preferred Pharmacy with phone number: [phone]  Local or Mail Order: local  Ordering Provider: nneka  Would the patient rather a call back or a response via [facility]?   Best Call Back Number:  Additional Information:       [facility]/pharmacy #[id number] - [place] - [address] AT [facility]  [address]  [place] [address]  Phone: [phone] Fax: [phone]

## 2024-11-14 NOTE — TELEPHONE ENCOUNTER
Phoned patient and wasn't able to leave a voicemail. I called to inform the patient that the refill request has been sent to the preferred pharmacy by the provider.

## 2024-11-15 DIAGNOSIS — M79.641 PAIN OF RIGHT HAND: Primary | ICD-10-CM

## 2024-11-15 DIAGNOSIS — M25.572 LEFT ANKLE PAIN, UNSPECIFIED CHRONICITY: ICD-10-CM

## 2024-11-19 ENCOUNTER — HOSPITAL ENCOUNTER (OUTPATIENT)
Dept: RADIOLOGY | Facility: HOSPITAL | Age: 24
Discharge: HOME | End: 2024-11-19
Attending: PHYSICIAN ASSISTANT
Payer: MEDICAID

## 2024-11-19 ENCOUNTER — OFFICE VISIT (OUTPATIENT)
Dept: ORTHOPEDICS | Facility: CLINIC | Age: 24
End: 2024-11-19
Payer: MEDICAID

## 2024-11-19 VITALS
HEIGHT: 68 IN | SYSTOLIC BLOOD PRESSURE: 115 MMHG | HEART RATE: 73 BPM | DIASTOLIC BLOOD PRESSURE: 71 MMHG | WEIGHT: 219.13 LBS | BODY MASS INDEX: 33.21 KG/M2 | TEMPERATURE: 97 F

## 2024-11-19 DIAGNOSIS — S92.255D CLOSED NONDISPLACED FRACTURE OF NAVICULAR BONE OF LEFT FOOT WITH ROUTINE HEALING, SUBSEQUENT ENCOUNTER: ICD-10-CM

## 2024-11-19 DIAGNOSIS — S62.231D OPEN DISPLACED FRACTURE OF BASE OF FIRST METACARPAL BONE OF RIGHT HAND WITH ROUTINE HEALING, UNSPECIFIED FRACTURE MORPHOLOGY, SUBSEQUENT ENCOUNTER: Primary | ICD-10-CM

## 2024-11-19 DIAGNOSIS — M25.512 LEFT SHOULDER PAIN, UNSPECIFIED CHRONICITY: Primary | ICD-10-CM

## 2024-11-19 DIAGNOSIS — M25.512 LEFT SHOULDER PAIN, UNSPECIFIED CHRONICITY: ICD-10-CM

## 2024-11-19 DIAGNOSIS — M25.572 LEFT ANKLE PAIN, UNSPECIFIED CHRONICITY: ICD-10-CM

## 2024-11-19 DIAGNOSIS — S42.252D CLOSED DISPLACED FRACTURE OF GREATER TUBEROSITY OF LEFT HUMERUS WITH ROUTINE HEALING, SUBSEQUENT ENCOUNTER: ICD-10-CM

## 2024-11-19 DIAGNOSIS — M79.641 PAIN OF RIGHT HAND: ICD-10-CM

## 2024-11-19 PROCEDURE — 73130 X-RAY EXAM OF HAND: CPT | Mod: 26,RT,, | Performed by: RADIOLOGY

## 2024-11-19 PROCEDURE — 99999 PR PBB SHADOW E&M-EST. PATIENT-LVL III: CPT | Mod: PBBFAC,,, | Performed by: PHYSICIAN ASSISTANT

## 2024-11-19 PROCEDURE — 73030 X-RAY EXAM OF SHOULDER: CPT | Mod: 26,LT,, | Performed by: RADIOLOGY

## 2024-11-19 PROCEDURE — 73130 X-RAY EXAM OF HAND: CPT | Mod: TC,RT

## 2024-11-19 PROCEDURE — 73610 X-RAY EXAM OF ANKLE: CPT | Mod: 26,LT,, | Performed by: RADIOLOGY

## 2024-11-19 PROCEDURE — 73030 X-RAY EXAM OF SHOULDER: CPT | Mod: TC,LT

## 2024-11-19 PROCEDURE — 3074F SYST BP LT 130 MM HG: CPT | Mod: CPTII,,, | Performed by: PHYSICIAN ASSISTANT

## 2024-11-19 PROCEDURE — 1160F RVW MEDS BY RX/DR IN RCRD: CPT | Mod: CPTII,,, | Performed by: PHYSICIAN ASSISTANT

## 2024-11-19 PROCEDURE — 99213 OFFICE O/P EST LOW 20 MIN: CPT | Mod: PBBFAC,25 | Performed by: PHYSICIAN ASSISTANT

## 2024-11-19 PROCEDURE — 1159F MED LIST DOCD IN RCRD: CPT | Mod: CPTII,,, | Performed by: PHYSICIAN ASSISTANT

## 2024-11-19 PROCEDURE — 99024 POSTOP FOLLOW-UP VISIT: CPT | Mod: ,,, | Performed by: PHYSICIAN ASSISTANT

## 2024-11-19 PROCEDURE — 3078F DIAST BP <80 MM HG: CPT | Mod: CPTII,,, | Performed by: PHYSICIAN ASSISTANT

## 2024-11-19 PROCEDURE — 73610 X-RAY EXAM OF ANKLE: CPT | Mod: TC,LT

## 2024-11-19 RX ORDER — OXYCODONE AND ACETAMINOPHEN 10; 325 MG/1; MG/1
1 TABLET ORAL EVERY 8 HOURS PRN
Qty: 21 TABLET | Refills: 0 | Status: SHIPPED | OUTPATIENT
Start: 2024-11-19 | End: 2024-11-27 | Stop reason: SDUPTHER
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
## 2024-11-27 DIAGNOSIS — S62.231D OPEN DISPLACED FRACTURE OF BASE OF FIRST METACARPAL BONE OF RIGHT HAND WITH ROUTINE HEALING, UNSPECIFIED FRACTURE MORPHOLOGY, SUBSEQUENT ENCOUNTER: ICD-10-CM

## 2024-11-27 DIAGNOSIS — S42.252D CLOSED DISPLACED FRACTURE OF GREATER TUBEROSITY OF LEFT HUMERUS WITH ROUTINE HEALING, SUBSEQUENT ENCOUNTER: ICD-10-CM

## 2024-11-27 DIAGNOSIS — S92.255D CLOSED NONDISPLACED FRACTURE OF NAVICULAR BONE OF LEFT FOOT WITH ROUTINE HEALING, SUBSEQUENT ENCOUNTER: ICD-10-CM

## 2024-11-27 NOTE — TELEPHONE ENCOUNTER
----- Message from Graciela sent at 11/27/2024  8:40 AM CST -----  Contact: nedra  Type:  RX Refill Request    Who Called: donnell  Refill or New Rx:refill  RX Name and Strength:oxycodone  How is the patient currently taking it? (ex. 1XDay):unknown  Is this a 30 day or 90 day RX:unknown  [facility]erred Pharm[facility] with phone number:  [id number] [place] [facility] [address] [address] [facility]  [address] [address] [facility] [address]  Phone: [phone] Fax: [phone]   Local or Mail Order:local  Ordering Provider:arturo early   Would the patient rather a call back or a response via [facility]? Call back   Best Call Back Number:[phone]  Additional Information:

## 2024-11-29 RX ORDER — OXYCODONE AND ACETAMINOPHEN 10; 325 MG/1; MG/1
1 TABLET ORAL EVERY 4 HOURS PRN
Refills: 0 | OUTPATIENT
Start: 2024-11-29

## 2024-11-29 RX ORDER — OXYCODONE AND ACETAMINOPHEN 10; 325 MG/1; MG/1
1 TABLET ORAL EVERY 8 HOURS PRN
Qty: 21 TABLET | Refills: 0 | Status: SHIPPED | OUTPATIENT
Start: 2024-11-29 | End: 2024-12-06

## 2024-11-29 NOTE — TELEPHONE ENCOUNTER
----- Message from NacTrapminerosette sent at 11/29/2024  8:05 AM CST -----  Contact: patient  Type:  Needs Medical Advice    Who Called: Paul Leal   Would the patient rather a call back or a response via [facility]? Call   Best Call Back Number:  [phone]  Additional Information:  pt would like a call back in regards to checking on the status of his refill request for Oxycodone.

## 2024-11-29 NOTE — TELEPHONE ENCOUNTER
Returned the patient's phone call in regards to their message. Informed the patient that I will send their refill request to  Lauro Morales PA-C. Informed the patient that Mr. Restrepo Andrew GOLDSTEIN is out of the office today and it is not a guarantee that he will refill it today. Patient verbalized understanding.

## 2024-11-29 NOTE — TELEPHONE ENCOUNTER
----- Message from Art sent at 11/29/2024 10:18 AM CST -----  Contact: patient  Paul Leal would like a call back @[phone], in regards to checking on the status of the prescription for his pain medication.

## 2024-11-29 NOTE — TELEPHONE ENCOUNTER
----- Message from Ariana sent at 11/29/2024  6:58 AM CST -----  Contact: Paul  .Patient is calling to speak with the nurse regarding medication . Reports waiting on script to be sent in to .  [id number] - [place] - [address] [address] [facility] AT [facility]  [address] [address] [place] [address]  Phone: [phone] Fax: [phone]  Please give patient a call back at   [phone]

## 2024-12-02 DIAGNOSIS — S42.252D CLOSED DISPLACED FRACTURE OF GREATER TUBEROSITY OF LEFT HUMERUS WITH ROUTINE HEALING, SUBSEQUENT ENCOUNTER: ICD-10-CM

## 2024-12-02 DIAGNOSIS — S92.255D CLOSED NONDISPLACED FRACTURE OF NAVICULAR BONE OF LEFT FOOT WITH ROUTINE HEALING, SUBSEQUENT ENCOUNTER: ICD-10-CM

## 2024-12-02 DIAGNOSIS — S62.231D OPEN DISPLACED FRACTURE OF BASE OF FIRST METACARPAL BONE OF RIGHT HAND WITH ROUTINE HEALING, UNSPECIFIED FRACTURE MORPHOLOGY, SUBSEQUENT ENCOUNTER: ICD-10-CM

## 2024-12-02 RX ORDER — OXYCODONE AND ACETAMINOPHEN 10; 325 MG/1; MG/1
1 TABLET ORAL EVERY 8 HOURS PRN
Qty: 21 TABLET | Refills: 0 | Status: SHIPPED | OUTPATIENT
Start: 2024-12-02 | End: 2024-12-09

## 2024-12-02 NOTE — TELEPHONE ENCOUNTER
----- Message from Emilia sent at 12/2/2024 10:03 AM CST -----  Contact: Paul Miller is needing a call back in regards to having his oxyCODONE-acetaminophen (PERCOCET)  mg per tablet Medication. He stated that the pharmacy never received the script. Please send to     [facility] [address] [place] [address]  Phone: [phone] Fax: [phone]     Please give him a call back at [phone]

## 2024-12-03 ENCOUNTER — OFFICE VISIT (OUTPATIENT)
Dept: ORTHOPEDICS | Facility: CLINIC | Age: 24
End: 2024-12-03
Payer: MEDICAID

## 2024-12-03 ENCOUNTER — TELEPHONE (OUTPATIENT)
Dept: ORTHOPEDICS | Facility: CLINIC | Age: 24
End: 2024-12-03
Payer: MEDICAID

## 2024-12-03 ENCOUNTER — HOSPITAL ENCOUNTER (OUTPATIENT)
Dept: RADIOLOGY | Facility: HOSPITAL | Age: 24
Discharge: HOME | End: 2024-12-03
Attending: PHYSICIAN ASSISTANT
Payer: MEDICAID

## 2024-12-03 VITALS
WEIGHT: 219.13 LBS | SYSTOLIC BLOOD PRESSURE: 113 MMHG | DIASTOLIC BLOOD PRESSURE: 66 MMHG | BODY MASS INDEX: 33.21 KG/M2 | HEIGHT: 68 IN | HEART RATE: 72 BPM

## 2024-12-03 DIAGNOSIS — S62.231D OPEN DISPLACED FRACTURE OF BASE OF FIRST METACARPAL BONE OF RIGHT HAND WITH ROUTINE HEALING, UNSPECIFIED FRACTURE MORPHOLOGY, SUBSEQUENT ENCOUNTER: Primary | ICD-10-CM

## 2024-12-03 DIAGNOSIS — S92.255D CLOSED NONDISPLACED FRACTURE OF NAVICULAR BONE OF LEFT FOOT WITH ROUTINE HEALING, SUBSEQUENT ENCOUNTER: ICD-10-CM

## 2024-12-03 DIAGNOSIS — M79.641 PAIN OF RIGHT HAND: ICD-10-CM

## 2024-12-03 DIAGNOSIS — M25.512 LEFT SHOULDER PAIN, UNSPECIFIED CHRONICITY: ICD-10-CM

## 2024-12-03 DIAGNOSIS — M25.572 LEFT ANKLE PAIN, UNSPECIFIED CHRONICITY: ICD-10-CM

## 2024-12-03 DIAGNOSIS — S42.252D CLOSED DISPLACED FRACTURE OF GREATER TUBEROSITY OF LEFT HUMERUS WITH ROUTINE HEALING, SUBSEQUENT ENCOUNTER: ICD-10-CM

## 2024-12-03 DIAGNOSIS — M25.572 LEFT ANKLE PAIN, UNSPECIFIED CHRONICITY: Primary | ICD-10-CM

## 2024-12-03 PROCEDURE — 1159F MED LIST DOCD IN RCRD: CPT | Mod: CPTII,,, | Performed by: PHYSICIAN ASSISTANT

## 2024-12-03 PROCEDURE — 73030 X-RAY EXAM OF SHOULDER: CPT | Mod: TC,LT

## 2024-12-03 PROCEDURE — 99213 OFFICE O/P EST LOW 20 MIN: CPT | Mod: PBBFAC,25 | Performed by: PHYSICIAN ASSISTANT

## 2024-12-03 PROCEDURE — 73130 X-RAY EXAM OF HAND: CPT | Mod: 26,RT,, | Performed by: RADIOLOGY

## 2024-12-03 PROCEDURE — 99024 POSTOP FOLLOW-UP VISIT: CPT | Mod: ,,, | Performed by: PHYSICIAN ASSISTANT

## 2024-12-03 PROCEDURE — 73130 X-RAY EXAM OF HAND: CPT | Mod: TC,RT

## 2024-12-03 PROCEDURE — 73030 X-RAY EXAM OF SHOULDER: CPT | Mod: 26,LT,, | Performed by: RADIOLOGY

## 2024-12-03 PROCEDURE — 73610 X-RAY EXAM OF ANKLE: CPT | Mod: TC,LT

## 2024-12-03 PROCEDURE — 73610 X-RAY EXAM OF ANKLE: CPT | Mod: 26,LT,, | Performed by: RADIOLOGY

## 2024-12-03 PROCEDURE — 1160F RVW MEDS BY RX/DR IN RCRD: CPT | Mod: CPTII,,, | Performed by: PHYSICIAN ASSISTANT

## 2024-12-03 PROCEDURE — 99999 PR PBB SHADOW E&M-EST. PATIENT-LVL III: CPT | Mod: PBBFAC,,, | Performed by: PHYSICIAN ASSISTANT

## 2024-12-03 PROCEDURE — 3074F SYST BP LT 130 MM HG: CPT | Mod: CPTII,,, | Performed by: PHYSICIAN ASSISTANT

## 2024-12-03 PROCEDURE — 3078F DIAST BP <80 MM HG: CPT | Mod: CPTII,,, | Performed by: PHYSICIAN ASSISTANT
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
## 2024-12-03 NOTE — TELEPHONE ENCOUNTER
----- Message from Minor sent at 12/3/2024  8:57 AM CST -----  Contact: Paul      Who Called:  Paul     Would the patient rather a call back or a response via [facility]? Call back   Best Call Back Number:  . [phone]  Additional Information: Pt is calling in regard to the missed appt scheduled on today and would like to get a call back to discuss appt availability for rescheduling     Thanks

## 2024-12-09 DIAGNOSIS — S92.255D CLOSED NONDISPLACED FRACTURE OF NAVICULAR BONE OF LEFT FOOT WITH ROUTINE HEALING, SUBSEQUENT ENCOUNTER: ICD-10-CM

## 2024-12-09 DIAGNOSIS — S62.231D OPEN DISPLACED FRACTURE OF BASE OF FIRST METACARPAL BONE OF RIGHT HAND WITH ROUTINE HEALING, UNSPECIFIED FRACTURE MORPHOLOGY, SUBSEQUENT ENCOUNTER: ICD-10-CM

## 2024-12-09 DIAGNOSIS — S42.252D CLOSED DISPLACED FRACTURE OF GREATER TUBEROSITY OF LEFT HUMERUS WITH ROUTINE HEALING, SUBSEQUENT ENCOUNTER: ICD-10-CM

## 2024-12-09 RX ORDER — OXYCODONE AND ACETAMINOPHEN 10; 325 MG/1; MG/1
1 TABLET ORAL EVERY 8 HOURS PRN
Qty: 21 TABLET | Refills: 0 | Status: SHIPPED | OUTPATIENT
Start: 2024-12-09 | End: 2024-12-16

## 2024-12-09 NOTE — TELEPHONE ENCOUNTER
----- Message from Maria Esther sent at 12/9/2024  7:41 AM CST -----  .Type:  RX Refill Request    Who Called: .Paul Leal   Refill or New Rx:refill  RX Name and Strength:oxyCODONE-acetaminophen (PERCOCET)  mg per tablet  How is the patient currently taking it? (ex. 1XDay):  Is this a 30 day or 90 day RX:    Preferred Pharmacy with phone number:.  [facility] [address] [place] [address] [place] [address]  Phone: [phone] Fax: [phone]     Local or Mail Order:local  Ordering Provider:Andrew  Would the patient rather a call back or a response via [facility]? Call back  Best Call Back Number:. [phone]  Additional Information: Patient is requesting a call back regarding medication. He stated pins coming out of his had and he is in a lot of pain.

## 2024-12-09 NOTE — TELEPHONE ENCOUNTER
----- Message from Maria Esther sent at 12/9/2024  3:04 PM CST -----  Name of Caller:Neetu Leal   Symptoms:hand pain  Best Call Back Number:. [phone]  Additional Information: Patient stated he was advised to call back at 3:00 regarding the refill of his pain medication. Please call the patient back to advise.

## 2024-12-09 NOTE — TELEPHONE ENCOUNTER
Spoke with patient and informed him that his refill request has been sent over to the provider. Patient verbalized understanding

## 2024-12-11 ENCOUNTER — OFFICE VISIT (OUTPATIENT)
Dept: ORTHOPEDICS | Facility: CLINIC | Age: 24
End: 2024-12-11
Payer: MEDICAID

## 2024-12-11 ENCOUNTER — HOSPITAL ENCOUNTER (OUTPATIENT)
Dept: RADIOLOGY | Facility: HOSPITAL | Age: 24
Discharge: HOME | End: 2024-12-11
Attending: ORTHOPAEDIC SURGERY
Payer: MEDICAID

## 2024-12-11 VITALS — BODY MASS INDEX: 33.21 KG/M2 | HEIGHT: 68 IN | WEIGHT: 219.13 LBS

## 2024-12-11 DIAGNOSIS — S62.231B: Primary | ICD-10-CM

## 2024-12-11 DIAGNOSIS — S62.231D OPEN DISPLACED FRACTURE OF BASE OF FIRST METACARPAL BONE OF RIGHT HAND WITH ROUTINE HEALING, UNSPECIFIED FRACTURE MORPHOLOGY, SUBSEQUENT ENCOUNTER: ICD-10-CM

## 2024-12-11 DIAGNOSIS — T84.84XA PAINFUL ORTHOPAEDIC HARDWARE: Primary | ICD-10-CM

## 2024-12-11 PROCEDURE — 73140 X-RAY EXAM OF FINGER(S): CPT | Mod: TC,RT

## 2024-12-11 PROCEDURE — 73140 X-RAY EXAM OF FINGER(S): CPT | Mod: 26,RT,, | Performed by: RADIOLOGY

## 2024-12-11 PROCEDURE — 99999 PR PBB SHADOW E&M-EST. PATIENT-LVL II: CPT | Mod: PBBFAC,,, | Performed by: ORTHOPAEDIC SURGERY

## 2024-12-11 PROCEDURE — 99212 OFFICE O/P EST SF 10 MIN: CPT | Mod: PBBFAC,25 | Performed by: ORTHOPAEDIC SURGERY

## 2024-12-11 NOTE — PROGRESS NOTES
JANETTE Burrows M.D.  Orthopaedic Hand and Wrist Surgery  [address]    Patient ID: Paul Leal  YOB: 2000  MRN: [id number]    Date of Surgery:  2024    Procedure Performed:   Open treatment of right thumb metacarpal fracture    History of Present Illness: Paul Leal is a 24 y.o. male 6 weeks out from open treatment right thumb metacarpal fracture he has here today for initial evaluation he was initially treated by Dr. Perales he has some retained pins that they were unable to remove in trauma clinic.    Patient was queried and this is the extent of the patients current complaints today.    Physical Exam:   Wound:  Pins in place  Sensation:  No decreased sensation of the dorsum of the thumb and pulp  Motor:  Intact EPL and FPL  Swelling:  Mild as expected    Imaging:   X-rays show healing of the right thumb metacarpal base fracture with retained hardware    Provider Note/Medical Decision Makin. Open displaced fracture of base of first metacarpal bone of right hand, unspecified fracture morphology, initial encounter  Assessment & Plan:  Patient has a clinically and radiographically healed right thumb metacarpal fracture.  We discussed the possibility of pin removal in clinic today he is very apprehensive of this.  We will schedule him for pin removal his right thumb as soon as possible.    I had a long discussion with the patient about treatment and diagnostic options. We discussed operative and non-operative options and expected outcomes of their diagnosis and co-morbidities. We discussed the risks and benefits of surgery at length, including but not limited to pain, infection, bleeding, damage to adjacent structures such as nerves and blood vessels, failure of appropriate healing, stiffness, scarring, laxity, need for more surgery, stroke, blood clot, loss of life, loss of limb, need for removal of any implants used, anesthesia risks, breathing  problems, and heart problems. We talked about common and uncommon risks, specifically the risks of arthritis and hardware breakage with this procedure.   We discussed expected treatment outcomes in regards to pain, function and the possibility of permanent impairment.  They expressed understanding of the risks and benefits an opted to proceed with right thumb pin removal under to monitor anesthesia care.               I discussed worrisome and red flag signs and symptoms with the patient. The patient expressed understanding and agreed to alert me immediately or to go to the emergency room if they experience any of these.   Treatment plan was developed with input from the patient/family, and they expressed understanding and agreement with the plan. All questions were answered today.    There are no Patient Instructions on file for this visit.    JANETTE Burrows M.D.  [facility]  Orthopedic Hand and Wrist Surgeon    [facility]  Dr. Leonel Burrows   [employer] Review   [employer]   [employer]     Disclaimer: This note was prepared using a voice recognition system and is likely to have sound alike errors within the text.

## 2024-12-11 NOTE — ASSESSMENT & PLAN NOTE
Patient has a clinically and radiographically healed right thumb metacarpal fracture.  We discussed the possibility of pin removal in clinic today he is very apprehensive of this.  We will schedule him for pin removal his right thumb as soon as possible.    I had a long discussion with the patient about treatment and diagnostic options. We discussed operative and non-operative options and expected outcomes of their diagnosis and co-morbidities. We discussed the risks and benefits of surgery at length, including but not limited to pain, infection, bleeding, damage to adjacent structures such as nerves and blood vessels, failure of appropriate healing, stiffness, scarring, laxity, need for more surgery, stroke, blood clot, loss of life, loss of limb, need for removal of any implants used, anesthesia risks, breathing problems, and heart problems. We talked about common and uncommon risks, specifically the risks of arthritis and hardware breakage with this procedure.   We discussed expected treatment outcomes in regards to pain, function and the possibility of permanent impairment.  They expressed understanding of the risks and benefits an opted to proceed with right thumb pin removal under to monitor anesthesia care.

## 2024-12-16 DIAGNOSIS — S92.255D CLOSED NONDISPLACED FRACTURE OF NAVICULAR BONE OF LEFT FOOT WITH ROUTINE HEALING, SUBSEQUENT ENCOUNTER: ICD-10-CM

## 2024-12-16 DIAGNOSIS — S42.252D CLOSED DISPLACED FRACTURE OF GREATER TUBEROSITY OF LEFT HUMERUS WITH ROUTINE HEALING, SUBSEQUENT ENCOUNTER: ICD-10-CM

## 2024-12-16 DIAGNOSIS — S62.231D OPEN DISPLACED FRACTURE OF BASE OF FIRST METACARPAL BONE OF RIGHT HAND WITH ROUTINE HEALING, UNSPECIFIED FRACTURE MORPHOLOGY, SUBSEQUENT ENCOUNTER: Primary | ICD-10-CM

## 2024-12-16 NOTE — TELEPHONE ENCOUNTER
Spoke with patient and informed him that his refill request has been forwarded to the provider. Patient verbalized understanding.

## 2024-12-16 NOTE — PLAN OF CARE
Called and spoke with the patient about the following:      Your Surgery arrival time is at 1:00pm on 12/17/24 at [facility].   The address is [address]. [place] [address].       *If you are running late or have questions the morning of surgery, please call the Pre-OP Department @ [phone].     Do not eat after 12 midnight, Do not smoke or use chewing tobacco after 12 midnight!  OK to brush teeth, but no gum, candy, or mints!      Patients should stop full meals at midnight, but they can consume clear liquids up to 2 hours prior to scheduled arrival time.  Clear liquids include Gatorade, water, soda, black coffee or tea (no milk or creamer), and clear juices.  Clear liquids do NOT include anything with pulp or food particles (Chicken broth, ice cream, yogurt, Jello, etc.)     Additional Instructions:  You may be required to provide a urine sample prior to procedure;   Please ask  for a specimen cup if you need to use the restroom prior to being called into pre-op.     Please come to the main lobby and be prepared to show your photo ID and insurance card.       Only take specific medications discussed with the Pre-Admit Nurse.      Please call with any questions or concerns ([phone] or [phone])    [facility] Visitor/Ride Policy:   Adults:  Only 1 adult allowed (must be 18 or older) to accompany you and MUST STAY through the entire length of admission.     -Must have a ride home from a responsible adult that you know and trust.    -Medical Transport, Uber or Lyft can only be used if patient has a responsible adult to accompany them during ride home.    Pediatrics:  Pediatric patients are encouraged to have 2 adults (must be 18 or older) accompany them to the surgery center.   ~If the parent/legal guardian is unable to stay for the duration of the surgery time,   you MUST have someone over the age of 18 able to stay with the patient until time of discharge.     ~The  parent/legal guardian must be available to be reached by phone at all times if they are unable to stay with the patient.

## 2024-12-16 NOTE — TELEPHONE ENCOUNTER
----- Message from Ailyn sent at 12/16/2024  8:12 AM CST -----  Contact: Paul  Type:  RX Refill Request    Who Called: Paul  Refill or New Rx:refill  RX Name and Strength:oxyCODONE-acetaminophen (PERCOCET)  mg per tablet   How is the patient currently taking it? (ex. 1XDay):as prescribed  Is this a 30 day or 90 day RX:as prescribed  Preferred Pharmacy with phone number:  [facility] [address] [address]  [facility] [address]  Phone: [phone] Fax: [phone]  Local or Mail Order:local  Ordering Provider:Lauro Morales  Would the patient rather a call back or a response via [facility]? call  Best Call Back Number:[phone]   Additional Information: Patient request to receive prescription refill. Please give patient a call back to notify when sent.  Thank you,  GH

## 2024-12-16 NOTE — TELEPHONE ENCOUNTER
----- Message from Shell sent at 12/16/2024  3:07 PM CST -----  Please refill the medication(s) listed below.Please call the patient when the prescription(s) is ready for  at this phone number        Medication #1 oxyCODONE-acetaminophen (PERCOCET)  mg per tablet  Medication #2  Medication #3      Preferred Pharmacy:      [facility] [place] [address] [place] [address] [place] [address]  Phone: [phone] Fax: [phone]          Would the patient rather a call back or a response via [facility]? CALL    Best Call Back Number:Telephone Information:  Mobile          [phone]        Additional Information: Pt requesting refill. Thank you

## 2024-12-17 ENCOUNTER — HOSPITAL ENCOUNTER (OUTPATIENT)
Facility: HOSPITAL | Age: 24
Discharge: HOME | End: 2024-12-17
Attending: ORTHOPAEDIC SURGERY | Admitting: ORTHOPAEDIC SURGERY
Payer: MEDICAID

## 2024-12-17 ENCOUNTER — HOSPITAL ENCOUNTER (OUTPATIENT)
Dept: RADIOLOGY | Facility: HOSPITAL | Age: 24
Discharge: HOME | End: 2024-12-17
Attending: ORTHOPAEDIC SURGERY | Admitting: ORTHOPAEDIC SURGERY
Payer: MEDICAID

## 2024-12-17 PROCEDURE — 73130 X-RAY EXAM OF HAND: CPT | Mod: TC,RT

## 2024-12-17 PROCEDURE — 73130 X-RAY EXAM OF HAND: CPT | Mod: 26,RT,, | Performed by: RADIOLOGY

## 2024-12-17 PROCEDURE — 99499 UNLISTED E&M SERVICE: CPT | Mod: ,,, | Performed by: ORTHOPAEDIC SURGERY

## 2024-12-17 RX ORDER — OXYCODONE AND ACETAMINOPHEN 10; 325 MG/1; MG/1
1 TABLET ORAL EVERY 8 HOURS PRN
Qty: 21 TABLET | Refills: 0 | Status: SHIPPED | OUTPATIENT
Start: 2024-12-17 | End: 2024-12-24

## 2024-12-17 NOTE — PLAN OF CARE
"Patient arrived to surgery without ride. Instructed patient he needed a ride and to have someone stay with him in order to proceed with surgery. Patient stated "I won't have to have surgery today because I already did it myself. I pulled out the pins." Patient stated "I'm in a hurry and don't have time to wait for the surgeon to see it unless he can see it in two minutes."  Instructed patient about risk of infection and advised him to wait for Dr. Burrows. Patient refused. Security arrived to pre-op. Security asked patient about his use of drugs or alcohol today. Patient stated he was not impaired and could drive himself home. Patient again refused to wait for Dr. Burrows, who was currently in the OR. Notified Dr. Burrows of the situation. Dr. Burrows ordered an xray. Patient agreed to stay for xray. The xray showed the pins were no longer present. Patient stated he could not wait any longer to see the surgeon as advised. "I have to go  my kid from school at 2:30." Patient agreed to come in for a follow up in two weeks. Advised patient of infection risks. He verbalized understanding and stated that he would call Dr. Burrows if any pain, redness or swelling appeared.  "

## 2024-12-23 DIAGNOSIS — S92.255D CLOSED NONDISPLACED FRACTURE OF NAVICULAR BONE OF LEFT FOOT WITH ROUTINE HEALING, SUBSEQUENT ENCOUNTER: ICD-10-CM

## 2024-12-23 DIAGNOSIS — S62.231D OPEN DISPLACED FRACTURE OF BASE OF FIRST METACARPAL BONE OF RIGHT HAND WITH ROUTINE HEALING, UNSPECIFIED FRACTURE MORPHOLOGY, SUBSEQUENT ENCOUNTER: ICD-10-CM

## 2024-12-23 DIAGNOSIS — S42.252D CLOSED DISPLACED FRACTURE OF GREATER TUBEROSITY OF LEFT HUMERUS WITH ROUTINE HEALING, SUBSEQUENT ENCOUNTER: ICD-10-CM

## 2024-12-23 RX ORDER — OXYCODONE AND ACETAMINOPHEN 10; 325 MG/1; MG/1
1 TABLET ORAL EVERY 8 HOURS PRN
Qty: 21 TABLET | Refills: 0 | Status: SHIPPED | OUTPATIENT
Start: 2024-12-24 | End: 2025-01-02

## 2024-12-23 NOTE — TELEPHONE ENCOUNTER
----- Message from Emilia sent at 12/23/2024  4:17 PM CST -----  Contact: April/mom  April is needing a call back in regards to the patients oxyCODONE-acetaminophen (PERCOCET)  mg per tablet medication not being sent to the pharmacy. Please send script to   [facility] [address] [place] [address]  Phone: [phone] Fax: [phone]    She stated that she has reached out to office with no response. Please give her a callback at [phone]

## 2024-12-23 NOTE — TELEPHONE ENCOUNTER
----- Message from Caitmitzyrosette sent at 12/23/2024  2:24 PM CST -----  Contact: [phone]  Type:  RX Refill Request    Who Called: WILLIAM CISSE [[id number]]  Refill or New Rx:refill  RX Name and Strength:oxyCODONE-acetaminophen (PERCOCET)  mg per tablet  How is the patient currently taking it? (ex. 1XDay):  Is this a 30 day or 90 day RX:  Preferred Pharmacy with phone number: [phone] Fax: [phone]    Local or Mail Order:local  Ordering Provider:LORA JAVED  Would the patient rather a call back or a response via [facility]? Call back  Best Call Back Number: [phone]  Additional Information: [id number]          [facility] [address] [place] [address] [place] [address]  Phone: [phone] Fax: [phone]

## 2024-12-23 NOTE — TELEPHONE ENCOUNTER
----- Message from Shireen sent at 12/23/2024  7:47 AM CST -----  Type:  RX Refill Request    Who Called: pt  Refill or New Rx:refill  RX Name and Strength:oxyCODONE-acetaminophen (PERCOCET)  mg per tablet 21 tablet 0 12/17/2024 12/24/2024 No  Sig - Route: Take 1 tablet by mouth every 8 (eight) hours as needed for Pain. - Oral  Sent to pharmacy as: oxyCODONE-acetaminophen (PERCOCET)  mg per tablet  Class: Normal  Earliest Fill Date: 12/17/2024  Notes to Pharmacy: Quantity prescribed more than 7 day supply? No  Order: [id number]  Date/Time Signed: 12/17/2024 10:57      Preferred Pharmacy with phone number:  [facility] [address] [place] [address] [place] [address]  Phone: [phone] Fax: [phone]      Local or Mail Order:local  Ordering Provider:nneka  Would the patient rather a call back or a response via [facility]? call  Best Call Back Number:[phone]  Additional Information: requesting a refill for medication

## 2024-12-24 DIAGNOSIS — S92.255D CLOSED NONDISPLACED FRACTURE OF NAVICULAR BONE OF LEFT FOOT WITH ROUTINE HEALING, SUBSEQUENT ENCOUNTER: ICD-10-CM

## 2024-12-24 DIAGNOSIS — S62.231D OPEN DISPLACED FRACTURE OF BASE OF FIRST METACARPAL BONE OF RIGHT HAND WITH ROUTINE HEALING, UNSPECIFIED FRACTURE MORPHOLOGY, SUBSEQUENT ENCOUNTER: ICD-10-CM

## 2024-12-24 DIAGNOSIS — S42.252D CLOSED DISPLACED FRACTURE OF GREATER TUBEROSITY OF LEFT HUMERUS WITH ROUTINE HEALING, SUBSEQUENT ENCOUNTER: ICD-10-CM

## 2024-12-24 RX ORDER — OXYCODONE AND ACETAMINOPHEN 10; 325 MG/1; MG/1
1 TABLET ORAL EVERY 8 HOURS PRN
Qty: 21 TABLET | Refills: 0 | OUTPATIENT
Start: 2024-12-24 | End: 2024-12-31

## 2024-12-24 NOTE — TELEPHONE ENCOUNTER
----- Message from Kalyani sent at 12/24/2024  7:19 AM CST -----  Contact: WILLIAM CISSE [[id number]]  .Type:  RX Refill Request    Who Called: WILLIAM CISSE [[id number]]  Refill or New Rx:refill  RX Name and Strength:oxyCODONE-acetaminophen (PERCOCET)  mg per tablet  How is the patient currently taking it? (ex. 1XDay):as proscribed   Is this a 30 day or 90 day RX:  Preferred Pharmacy with phone number:.  [id number] - [place] - [facility] [address] [place] [address]  Phone: [phone] Fax: [phone]      Local or Mail Order:local  Ordering Provider:Lauro Morales  Would the patient rather a call back or a response via [facility]? call  Best Call Back Number:.[phone] (home)     Additional Information:

## 2024-12-26 ENCOUNTER — TELEPHONE (OUTPATIENT)
Dept: SPORTS MEDICINE | Facility: CLINIC | Age: 24
End: 2024-12-26
Payer: MEDICAID

## 2024-12-26 NOTE — TELEPHONE ENCOUNTER
----- Message from Minor sent at 12/26/2024  8:42 AM CST -----  Contact: Paul  Appt Request .    Name of Caller  - Paul    Reason for Visit/Symptoms - 2wk follow up   Best Contact Number or Confirm if Mychart Preferred - Call back at .[phone]  Preferred Date/Time of Appointment - 1st available at the [place] location    Interested in Virtual Visit (yes/no)- no   Additional Information         Thanks

## 2024-12-27 DIAGNOSIS — T84.84XA PAINFUL ORTHOPAEDIC HARDWARE: Primary | ICD-10-CM

## 2024-12-27 DIAGNOSIS — M79.641 PAIN OF RIGHT HAND: ICD-10-CM

## 2024-12-31 ENCOUNTER — HOSPITAL ENCOUNTER (OUTPATIENT)
Dept: RADIOLOGY | Facility: HOSPITAL | Age: 24
Discharge: HOME | End: 2024-12-31
Attending: ORTHOPAEDIC SURGERY
Payer: MEDICAID

## 2024-12-31 ENCOUNTER — OFFICE VISIT (OUTPATIENT)
Dept: ORTHOPEDICS | Facility: CLINIC | Age: 24
End: 2024-12-31
Payer: MEDICAID

## 2024-12-31 ENCOUNTER — TELEPHONE (OUTPATIENT)
Dept: ORTHOPEDICS | Facility: CLINIC | Age: 24
End: 2024-12-31
Payer: MEDICAID

## 2024-12-31 VITALS — WEIGHT: 219.13 LBS | HEIGHT: 68 IN | BODY MASS INDEX: 33.21 KG/M2

## 2024-12-31 DIAGNOSIS — M79.641 PAIN OF RIGHT HAND: ICD-10-CM

## 2024-12-31 DIAGNOSIS — S42.252D CLOSED DISPLACED FRACTURE OF GREATER TUBEROSITY OF LEFT HUMERUS WITH ROUTINE HEALING, SUBSEQUENT ENCOUNTER: ICD-10-CM

## 2024-12-31 DIAGNOSIS — S62.231B: Primary | ICD-10-CM

## 2024-12-31 DIAGNOSIS — S62.231D OPEN DISPLACED FRACTURE OF BASE OF FIRST METACARPAL BONE OF RIGHT HAND WITH ROUTINE HEALING, UNSPECIFIED FRACTURE MORPHOLOGY, SUBSEQUENT ENCOUNTER: ICD-10-CM

## 2024-12-31 DIAGNOSIS — S92.255D CLOSED NONDISPLACED FRACTURE OF NAVICULAR BONE OF LEFT FOOT WITH ROUTINE HEALING, SUBSEQUENT ENCOUNTER: ICD-10-CM

## 2024-12-31 PROCEDURE — 99999 PR PBB SHADOW E&M-EST. PATIENT-LVL II: CPT | Mod: PBBFAC,,, | Performed by: ORTHOPAEDIC SURGERY

## 2024-12-31 PROCEDURE — 73130 X-RAY EXAM OF HAND: CPT | Mod: 26,RT,, | Performed by: RADIOLOGY

## 2024-12-31 PROCEDURE — 3008F BODY MASS INDEX DOCD: CPT | Mod: CPTII,,, | Performed by: ORTHOPAEDIC SURGERY

## 2024-12-31 PROCEDURE — 99024 POSTOP FOLLOW-UP VISIT: CPT | Mod: S$PBB,,, | Performed by: ORTHOPAEDIC SURGERY

## 2024-12-31 PROCEDURE — 1159F MED LIST DOCD IN RCRD: CPT | Mod: CPTII,,, | Performed by: ORTHOPAEDIC SURGERY

## 2024-12-31 PROCEDURE — 99212 OFFICE O/P EST SF 10 MIN: CPT | Mod: PBBFAC,25 | Performed by: ORTHOPAEDIC SURGERY

## 2024-12-31 PROCEDURE — 73130 X-RAY EXAM OF HAND: CPT | Mod: TC,RT

## 2024-12-31 RX ORDER — OXYCODONE AND ACETAMINOPHEN 10; 325 MG/1; MG/1
1 TABLET ORAL EVERY 8 HOURS PRN
Qty: 21 TABLET | Refills: 0 | OUTPATIENT
Start: 2024-12-31 | End: 2025-01-07

## 2024-12-31 NOTE — TELEPHONE ENCOUNTER
----- Message from Contextool sent at 12/31/2024  7:20 AM CST -----  Contact: self  ..Type:  RX Refill Request    Who Called: ..Paul Leal  Refill or New Rx:Refill   RX Name and Strength: oxyCODONE-acetaminophen (PERCOCET)  mg per tablet  How is the patient currently taking it? (ex. 1XDay):  Is this a 30 day or 90 day RX:  Preferred Pharmacy with phone number:.  [facility] [address] [place] [address] [place] [address]  Phone: [phone] Fax: [phone]  Local or Mail Order:local   Ordering Provider:Andrew   Would the patient rather a call back or a response via [facility]? Call back   Best Call Back Number:.[phone]  Additional Information:

## 2024-12-31 NOTE — TELEPHONE ENCOUNTER
----- Message from PLUQ sent at 12/31/2024  9:55 AM CST -----  Contact: self  ..Type:  Needs Medical Advice    Who Called: .Paul Leal  Would the patient rather a call back or a response via [facility]? Call back   Best Call Back Number: .[phone] (home)   Additional Information: pt is asking for an return call in reference to concerns he has.

## 2024-12-31 NOTE — PROGRESS NOTES
JANETTE Burrows M.D.  Orthopaedic Hand and Wrist Surgery  [address]    Patient ID: Paul Leal  YOB: 2000  MRN: [id number]    Diagnosis:   Right thumb CMC fracture dislocation    History of Present Illness: Paul Leal is a 24 y.o. male removed his own pins from his right thumb.  He is here today for follow up    Patient was queried and this is the extent of the patients current complaints today.    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 Open displaced fracture of base of first metacarpal bone of right hand, unspecified fracture morphology, initial encounter  Assessment & Plan:  Patient understands he will likely develop some arthritis and likely have persistent stiffness to his thumb we will get him into therapy.  He will follow up with Dr. Perales in 2 months.           I discussed worrisome and red flag signs and symptoms with the patient. The patient expressed understanding and agreed to alert me immediately or to go to the emergency room if they experience any of these.   Treatment plan was developed with input from the patient/family, and they expressed understanding and agreement with the plan. All questions were answered today.    There are no Patient Instructions on file for this visit.    JANETTE Burrows M.D.  [facility]  Orthopedic Hand and Wrist Surgeon    [facility]  Dr. Leonel Burrows   [employer] Review   [employer]   [employer]     Disclaimer: This note was prepared using a voice recognition system and is likely to have sound alike errors within the text.

## 2024-12-31 NOTE — ASSESSMENT & PLAN NOTE
Patient understands he will likely develop some arthritis and likely have persistent stiffness to his thumb we will get him into therapy.  He will follow up with Dr. Perales in 2 months.

## 2024-12-31 NOTE — TELEPHONE ENCOUNTER
Spoke with patient and advised him to discus his prescription refill with Dr Burrows at his appointment today. Patient verbalized understanding.

## 2025-01-08 ENCOUNTER — TELEPHONE (OUTPATIENT)
Dept: ORTHOPEDICS | Facility: CLINIC | Age: 25
End: 2025-01-08
Payer: MEDICAID

## 2025-01-08 NOTE — TELEPHONE ENCOUNTER
----- Message from Leona sent at 1/8/2025  2:17 PM CST -----  Regarding: Appt  Contact: [phone]  Patient is calling to schedule post op appointment and x ray needing new orders in his chart. Please contact pt

## 2025-01-08 NOTE — TELEPHONE ENCOUNTER
----- Message from Emilia sent at 1/8/2025 10:21 AM CST -----  Contact: Paul Miller is needing a call back in regards to r/s his appt that was missed. Please give him a call back at [phone]

## 2025-01-09 ENCOUNTER — HOSPITAL ENCOUNTER (OUTPATIENT)
Dept: RADIOLOGY | Facility: HOSPITAL | Age: 25
Discharge: HOME | End: 2025-01-09
Attending: PHYSICIAN ASSISTANT
Payer: MEDICAID

## 2025-01-09 ENCOUNTER — OFFICE VISIT (OUTPATIENT)
Dept: ORTHOPEDICS | Facility: CLINIC | Age: 25
End: 2025-01-09
Payer: MEDICAID

## 2025-01-09 VITALS
HEART RATE: 73 BPM | WEIGHT: 219.13 LBS | BODY MASS INDEX: 33.21 KG/M2 | SYSTOLIC BLOOD PRESSURE: 119 MMHG | DIASTOLIC BLOOD PRESSURE: 69 MMHG | HEIGHT: 68 IN

## 2025-01-09 DIAGNOSIS — S42.252D CLOSED DISPLACED FRACTURE OF GREATER TUBEROSITY OF LEFT HUMERUS WITH ROUTINE HEALING, SUBSEQUENT ENCOUNTER: ICD-10-CM

## 2025-01-09 DIAGNOSIS — S92.255D CLOSED NONDISPLACED FRACTURE OF NAVICULAR BONE OF LEFT FOOT WITH ROUTINE HEALING, SUBSEQUENT ENCOUNTER: ICD-10-CM

## 2025-01-09 DIAGNOSIS — M79.644 PAIN OF RIGHT THUMB: Primary | ICD-10-CM

## 2025-01-09 DIAGNOSIS — M25.512 LEFT SHOULDER PAIN, UNSPECIFIED CHRONICITY: ICD-10-CM

## 2025-01-09 DIAGNOSIS — M25.572 LEFT ANKLE PAIN, UNSPECIFIED CHRONICITY: ICD-10-CM

## 2025-01-09 DIAGNOSIS — M79.641 PAIN OF RIGHT HAND: ICD-10-CM

## 2025-01-09 DIAGNOSIS — S62.231D OPEN DISPLACED FRACTURE OF BASE OF FIRST METACARPAL BONE OF RIGHT HAND WITH ROUTINE HEALING, UNSPECIFIED FRACTURE MORPHOLOGY, SUBSEQUENT ENCOUNTER: Primary | ICD-10-CM

## 2025-01-09 PROCEDURE — 73130 X-RAY EXAM OF HAND: CPT | Mod: TC,RT

## 2025-01-09 PROCEDURE — 73030 X-RAY EXAM OF SHOULDER: CPT | Mod: 26,LT,, | Performed by: RADIOLOGY

## 2025-01-09 PROCEDURE — 1159F MED LIST DOCD IN RCRD: CPT | Mod: CPTII,,, | Performed by: PHYSICIAN ASSISTANT

## 2025-01-09 PROCEDURE — 3078F DIAST BP <80 MM HG: CPT | Mod: CPTII,,, | Performed by: PHYSICIAN ASSISTANT

## 2025-01-09 PROCEDURE — 73030 X-RAY EXAM OF SHOULDER: CPT | Mod: TC,LT

## 2025-01-09 PROCEDURE — 99024 POSTOP FOLLOW-UP VISIT: CPT | Mod: ,,, | Performed by: PHYSICIAN ASSISTANT

## 2025-01-09 PROCEDURE — 73130 X-RAY EXAM OF HAND: CPT | Mod: 26,RT,, | Performed by: RADIOLOGY

## 2025-01-09 PROCEDURE — 73610 X-RAY EXAM OF ANKLE: CPT | Mod: 26,LT,, | Performed by: RADIOLOGY

## 2025-01-09 PROCEDURE — 3074F SYST BP LT 130 MM HG: CPT | Mod: CPTII,,, | Performed by: PHYSICIAN ASSISTANT

## 2025-01-09 PROCEDURE — 99213 OFFICE O/P EST LOW 20 MIN: CPT | Mod: PBBFAC,25 | Performed by: PHYSICIAN ASSISTANT

## 2025-01-09 PROCEDURE — 99999 PR PBB SHADOW E&M-EST. PATIENT-LVL III: CPT | Mod: PBBFAC,,, | Performed by: PHYSICIAN ASSISTANT

## 2025-01-09 PROCEDURE — 1160F RVW MEDS BY RX/DR IN RCRD: CPT | Mod: CPTII,,, | Performed by: PHYSICIAN ASSISTANT

## 2025-01-09 PROCEDURE — 73610 X-RAY EXAM OF ANKLE: CPT | Mod: TC,LT
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
## 2025-01-16 ENCOUNTER — TELEPHONE (OUTPATIENT)
Dept: ORTHOPEDICS | Facility: CLINIC | Age: 25
End: 2025-01-16
Payer: MEDICAID

## 2025-01-16 NOTE — TELEPHONE ENCOUNTER
----- Message from Graciela sent at 1/16/2025 10:54 AM CST -----  Contact: [facility]  Type:  Needs Medical Advice    Who Called: april  Symptoms (please be specific):    How long has patient had these symptoms:    Pharmacy name and phone #:    Would the patient rather a call back or a response via [facility]? Call back  Best Call Back Number: phone [phone]  fax [phone]  Additional Information: request medical records

## 2025-03-10 ENCOUNTER — TELEPHONE (OUTPATIENT)
Dept: ORTHOPEDICS | Facility: CLINIC | Age: 25
End: 2025-03-10
Payer: MEDICAID

## 2025-03-10 NOTE — TELEPHONE ENCOUNTER
----- Message from Nurse Nielsen sent at 3/10/2025  1:28 PM CDT -----  Contact: Paul    ----- Message -----  From: Ailyn Will  Sent: 3/10/2025   1:27 PM CDT  To: [facility] Clinical Staff    Type:  Sooner Apoointment RequestCaller is requesting a sooner appointment. Caller will not accept being placed on the waitlist and is requesting a message be sent to doctor.Name of Caller:Maia is the first available appointment?unknownSymptoms:X-ray/Post-opWould the patient rather a call back or a response via [facility]? [facility]:[phone] Information: Patient reports schedule for visit today and request to reschedule visit for another date. Thank you,GH

## 2025-03-10 NOTE — TELEPHONE ENCOUNTER
Spoke with patient and was able to get appointment rescheduled to a different day due to patient not being able to make appointment on today. Patient verbalized understanding of appointment date, time, and location.

## 2025-03-11 ENCOUNTER — HOSPITAL ENCOUNTER (OUTPATIENT)
Dept: RADIOLOGY | Facility: HOSPITAL | Age: 25
Discharge: HOME | End: 2025-03-11
Attending: PHYSICIAN ASSISTANT
Payer: MEDICAID

## 2025-03-11 ENCOUNTER — OFFICE VISIT (OUTPATIENT)
Dept: ORTHOPEDICS | Facility: CLINIC | Age: 25
End: 2025-03-11
Attending: PHYSICIAN ASSISTANT
Payer: MEDICAID

## 2025-03-11 VITALS
WEIGHT: 219.13 LBS | BODY MASS INDEX: 33.21 KG/M2 | SYSTOLIC BLOOD PRESSURE: 109 MMHG | HEART RATE: 85 BPM | HEIGHT: 68 IN | DIASTOLIC BLOOD PRESSURE: 67 MMHG

## 2025-03-11 DIAGNOSIS — S42.252D CLOSED DISPLACED FRACTURE OF GREATER TUBEROSITY OF LEFT HUMERUS WITH ROUTINE HEALING, SUBSEQUENT ENCOUNTER: ICD-10-CM

## 2025-03-11 DIAGNOSIS — S62.231D OPEN DISPLACED FRACTURE OF BASE OF FIRST METACARPAL BONE OF RIGHT HAND WITH ROUTINE HEALING, UNSPECIFIED FRACTURE MORPHOLOGY, SUBSEQUENT ENCOUNTER: Primary | ICD-10-CM

## 2025-03-11 DIAGNOSIS — M79.644 PAIN OF RIGHT THUMB: ICD-10-CM

## 2025-03-11 PROCEDURE — 99213 OFFICE O/P EST LOW 20 MIN: CPT | Mod: PBBFAC,25 | Performed by: PHYSICIAN ASSISTANT

## 2025-03-11 PROCEDURE — 73140 X-RAY EXAM OF FINGER(S): CPT | Mod: TC

## 2025-03-11 PROCEDURE — 99999 PR PBB SHADOW E&M-EST. PATIENT-LVL III: CPT | Mod: PBBFAC,,, | Performed by: PHYSICIAN ASSISTANT

## 2025-03-11 PROCEDURE — 3008F BODY MASS INDEX DOCD: CPT | Mod: CPTII,,, | Performed by: PHYSICIAN ASSISTANT

## 2025-03-11 PROCEDURE — 99214 OFFICE O/P EST MOD 30 MIN: CPT | Mod: S$PBB,,, | Performed by: PHYSICIAN ASSISTANT

## 2025-03-11 PROCEDURE — 73140 X-RAY EXAM OF FINGER(S): CPT | Mod: 26,RT,, | Performed by: RADIOLOGY

## 2025-03-11 PROCEDURE — 3078F DIAST BP <80 MM HG: CPT | Mod: CPTII,,, | Performed by: PHYSICIAN ASSISTANT

## 2025-03-11 PROCEDURE — 3074F SYST BP LT 130 MM HG: CPT | Mod: CPTII,,, | Performed by: PHYSICIAN ASSISTANT

## 2025-03-11 PROCEDURE — 1160F RVW MEDS BY RX/DR IN RCRD: CPT | Mod: CPTII,,, | Performed by: PHYSICIAN ASSISTANT

## 2025-03-11 PROCEDURE — 1159F MED LIST DOCD IN RCRD: CPT | Mod: CPTII,,, | Performed by: PHYSICIAN ASSISTANT

## 2025-03-11 RX ORDER — OXYCODONE AND ACETAMINOPHEN 10; 325 MG/1; MG/1
1 TABLET ORAL 2 TIMES DAILY PRN
COMMUNITY
Start: 2025-02-26
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
## 2025-03-13 ENCOUNTER — HOSPITAL ENCOUNTER (OUTPATIENT)
Dept: RADIOLOGY | Facility: HOSPITAL | Age: 25
Discharge: HOME | End: 2025-03-13
Attending: PODIATRIST
Payer: MEDICAID

## 2025-03-13 ENCOUNTER — OFFICE VISIT (OUTPATIENT)
Dept: PODIATRY | Facility: CLINIC | Age: 25
End: 2025-03-13
Payer: MEDICAID

## 2025-03-13 DIAGNOSIS — S92.252P: ICD-10-CM

## 2025-03-13 DIAGNOSIS — Z72.0 TOBACCO ABUSE: ICD-10-CM

## 2025-03-13 DIAGNOSIS — M79.672 PAIN IN LEFT FOOT: ICD-10-CM

## 2025-03-13 DIAGNOSIS — S92.252P: Primary | ICD-10-CM

## 2025-03-13 DIAGNOSIS — M19.072 OSTEOARTHRITIS OF LEFT ANKLE OR FOOT: ICD-10-CM

## 2025-03-13 PROCEDURE — 1160F RVW MEDS BY RX/DR IN RCRD: CPT | Mod: CPTII,,, | Performed by: PODIATRIST

## 2025-03-13 PROCEDURE — 99212 OFFICE O/P EST SF 10 MIN: CPT | Mod: PBBFAC,25 | Performed by: PODIATRIST

## 2025-03-13 PROCEDURE — 1159F MED LIST DOCD IN RCRD: CPT | Mod: CPTII,,, | Performed by: PODIATRIST

## 2025-03-13 PROCEDURE — 99204 OFFICE O/P NEW MOD 45 MIN: CPT | Mod: S$PBB,,, | Performed by: PODIATRIST

## 2025-03-13 PROCEDURE — 99999 PR PBB SHADOW E&M-EST. PATIENT-LVL II: CPT | Mod: PBBFAC,,, | Performed by: PODIATRIST

## 2025-03-13 PROCEDURE — 73630 X-RAY EXAM OF FOOT: CPT | Mod: TC,LT

## 2025-03-13 PROCEDURE — 73630 X-RAY EXAM OF FOOT: CPT | Mod: 26,LT,, | Performed by: RADIOLOGY

## 2025-03-13 RX ORDER — OXYCODONE AND ACETAMINOPHEN 7.5; 325 MG/1; MG/1
1 TABLET ORAL EVERY 6 HOURS PRN
Qty: 28 TABLET | Refills: 0 | Status: SHIPPED | OUTPATIENT
Start: 2025-03-13 | End: 2025-03-20
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
## 2025-03-18 ENCOUNTER — HOSPITAL ENCOUNTER (OUTPATIENT)
Dept: RADIOLOGY | Facility: HOSPITAL | Age: 25
Discharge: HOME | End: 2025-03-18
Attending: PHYSICIAN ASSISTANT
Payer: MEDICAID

## 2025-03-18 ENCOUNTER — HOSPITAL ENCOUNTER (OUTPATIENT)
Dept: RADIOLOGY | Facility: HOSPITAL | Age: 25
Discharge: HOME | End: 2025-03-18
Attending: PODIATRIST
Payer: MEDICAID

## 2025-03-18 ENCOUNTER — OFFICE VISIT (OUTPATIENT)
Dept: ORTHOPEDICS | Facility: CLINIC | Age: 25
End: 2025-03-18
Payer: MEDICAID

## 2025-03-18 VITALS
BODY MASS INDEX: 33.21 KG/M2 | HEIGHT: 68 IN | SYSTOLIC BLOOD PRESSURE: 119 MMHG | WEIGHT: 219.13 LBS | DIASTOLIC BLOOD PRESSURE: 72 MMHG

## 2025-03-18 DIAGNOSIS — S92.255D CLOSED NONDISPLACED FRACTURE OF NAVICULAR BONE OF LEFT FOOT WITH ROUTINE HEALING, SUBSEQUENT ENCOUNTER: ICD-10-CM

## 2025-03-18 DIAGNOSIS — S42.252D CLOSED DISPLACED FRACTURE OF GREATER TUBEROSITY OF LEFT HUMERUS WITH ROUTINE HEALING, SUBSEQUENT ENCOUNTER: ICD-10-CM

## 2025-03-18 DIAGNOSIS — M19.072 OSTEOARTHRITIS OF LEFT ANKLE OR FOOT: ICD-10-CM

## 2025-03-18 DIAGNOSIS — S92.252P: ICD-10-CM

## 2025-03-18 DIAGNOSIS — S62.231D OPEN DISPLACED FRACTURE OF BASE OF FIRST METACARPAL BONE OF RIGHT HAND WITH ROUTINE HEALING, UNSPECIFIED FRACTURE MORPHOLOGY, SUBSEQUENT ENCOUNTER: Primary | ICD-10-CM

## 2025-03-18 PROCEDURE — 99213 OFFICE O/P EST LOW 20 MIN: CPT | Mod: PBBFAC,25 | Performed by: PHYSICIAN ASSISTANT

## 2025-03-18 PROCEDURE — 73221 MRI JOINT UPR EXTREM W/O DYE: CPT | Mod: 26,LT,, | Performed by: RADIOLOGY

## 2025-03-18 PROCEDURE — 73700 CT LOWER EXTREMITY W/O DYE: CPT | Mod: TC,LT

## 2025-03-18 PROCEDURE — 1160F RVW MEDS BY RX/DR IN RCRD: CPT | Mod: CPTII,,, | Performed by: PHYSICIAN ASSISTANT

## 2025-03-18 PROCEDURE — 1159F MED LIST DOCD IN RCRD: CPT | Mod: CPTII,,, | Performed by: PHYSICIAN ASSISTANT

## 2025-03-18 PROCEDURE — 73221 MRI JOINT UPR EXTREM W/O DYE: CPT | Mod: TC,LT

## 2025-03-18 PROCEDURE — 99999 PR PBB SHADOW E&M-EST. PATIENT-LVL III: CPT | Mod: PBBFAC,,, | Performed by: PHYSICIAN ASSISTANT

## 2025-03-18 PROCEDURE — 73700 CT LOWER EXTREMITY W/O DYE: CPT | Mod: 26,LT,, | Performed by: RADIOLOGY

## 2025-03-18 PROCEDURE — 3008F BODY MASS INDEX DOCD: CPT | Mod: CPTII,,, | Performed by: PHYSICIAN ASSISTANT

## 2025-03-18 PROCEDURE — 3078F DIAST BP <80 MM HG: CPT | Mod: CPTII,,, | Performed by: PHYSICIAN ASSISTANT

## 2025-03-18 PROCEDURE — 3074F SYST BP LT 130 MM HG: CPT | Mod: CPTII,,, | Performed by: PHYSICIAN ASSISTANT

## 2025-03-18 PROCEDURE — 99214 OFFICE O/P EST MOD 30 MIN: CPT | Mod: S$PBB,,, | Performed by: PHYSICIAN ASSISTANT
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
## 2025-03-25 ENCOUNTER — OFFICE VISIT (OUTPATIENT)
Dept: PODIATRY | Facility: CLINIC | Age: 25
End: 2025-03-25
Payer: MEDICAID

## 2025-03-25 DIAGNOSIS — M19.072 OSTEOARTHRITIS OF LEFT ANKLE OR FOOT: ICD-10-CM

## 2025-03-25 DIAGNOSIS — S92.252P: Primary | ICD-10-CM

## 2025-03-25 DIAGNOSIS — Z78.9 UNDER CARE OF PAIN MANAGEMENT SPECIALIST: ICD-10-CM

## 2025-03-25 DIAGNOSIS — M79.672 PAIN IN LEFT FOOT: ICD-10-CM

## 2025-03-25 DIAGNOSIS — Z01.818 PREOP EXAM FOR INTERNAL MEDICINE: ICD-10-CM

## 2025-03-25 DIAGNOSIS — Z72.0 TOBACCO ABUSE: ICD-10-CM

## 2025-03-25 PROCEDURE — 99999 PR PBB SHADOW E&M-EST. PATIENT-LVL III: CPT | Mod: PBBFAC,,, | Performed by: PODIATRIST

## 2025-03-25 PROCEDURE — 99213 OFFICE O/P EST LOW 20 MIN: CPT | Mod: PBBFAC | Performed by: PODIATRIST
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
## 2025-04-02 ENCOUNTER — OFFICE VISIT (OUTPATIENT)
Dept: INTERNAL MEDICINE | Facility: CLINIC | Age: 25
End: 2025-04-02
Payer: MEDICAID

## 2025-04-02 ENCOUNTER — LAB VISIT (OUTPATIENT)
Dept: LAB | Facility: HOSPITAL | Age: 25
End: 2025-04-02
Attending: NURSE PRACTITIONER
Payer: MEDICAID

## 2025-04-02 DIAGNOSIS — Z01.818 PREOP EXAM FOR INTERNAL MEDICINE: ICD-10-CM

## 2025-04-02 DIAGNOSIS — S92.255G CLOSED NONDISPLACED FRACTURE OF NAVICULAR BONE OF LEFT FOOT WITH DELAYED HEALING, SUBSEQUENT ENCOUNTER: Primary | ICD-10-CM

## 2025-04-02 LAB
ABSOLUTE EOSINOPHIL (OHS): 0.1 K/UL
ABSOLUTE MONOCYTE (OHS): 0.63 K/UL (ref 0.3–1)
ABSOLUTE NEUTROPHIL COUNT (OHS): 7.29 K/UL (ref 1.8–7.7)
ALBUMIN SERPL BCP-MCNC: 3.8 G/DL (ref 3.5–5.2)
ALP SERPL-CCNC: 127 UNIT/L (ref 40–150)
ALT SERPL W/O P-5'-P-CCNC: 22 UNIT/L (ref 10–44)
ANION GAP (OHS): 7 MMOL/L (ref 8–16)
AST SERPL-CCNC: 21 UNIT/L (ref 11–45)
BASOPHILS # BLD AUTO: 0.04 K/UL
BASOPHILS NFR BLD AUTO: 0.4 %
BILIRUB SERPL-MCNC: 0.6 MG/DL (ref 0.1–1)
BUN SERPL-MCNC: 14 MG/DL (ref 6–20)
CALCIUM SERPL-MCNC: 8.9 MG/DL (ref 8.7–10.5)
CHLORIDE SERPL-SCNC: 106 MMOL/L (ref 95–110)
CO2 SERPL-SCNC: 27 MMOL/L (ref 23–29)
CREAT SERPL-MCNC: 0.9 MG/DL (ref 0.5–1.4)
ERYTHROCYTE [DISTWIDTH] IN BLOOD BY AUTOMATED COUNT: 14.2 % (ref 11.5–14.5)
GFR SERPLBLD CREATININE-BSD FMLA CKD-EPI: >60 ML/MIN/1.73/M2
GLUCOSE SERPL-MCNC: 81 MG/DL (ref 70–110)
HCT VFR BLD AUTO: 45.2 % (ref 40–54)
HGB BLD-MCNC: 14.8 GM/DL (ref 14–18)
IMM GRANULOCYTES # BLD AUTO: 0.04 K/UL (ref 0–0.04)
IMM GRANULOCYTES NFR BLD AUTO: 0.4 % (ref 0–0.5)
LYMPHOCYTES # BLD AUTO: 2.09 K/UL (ref 1–4.8)
MCH RBC QN AUTO: 29.3 PG (ref 27–31)
MCHC RBC AUTO-ENTMCNC: 32.7 G/DL (ref 32–36)
MCV RBC AUTO: 90 FL (ref 82–98)
NUCLEATED RBC (/100WBC) (OHS): 0 /100 WBC
PLATELET # BLD AUTO: 227 K/UL (ref 150–450)
PMV BLD AUTO: 11.2 FL (ref 9.2–12.9)
POTASSIUM SERPL-SCNC: 4 MMOL/L (ref 3.5–5.1)
PROT SERPL-MCNC: 7.7 GM/DL (ref 6–8.4)
RBC # BLD AUTO: 5.05 M/UL (ref 4.6–6.2)
RELATIVE EOSINOPHIL (OHS): 1 %
RELATIVE LYMPHOCYTE (OHS): 20.5 % (ref 18–48)
RELATIVE MONOCYTE (OHS): 6.2 % (ref 4–15)
RELATIVE NEUTROPHIL (OHS): 71.5 % (ref 38–73)
SODIUM SERPL-SCNC: 140 MMOL/L (ref 136–145)
WBC # BLD AUTO: 10.19 K/UL (ref 3.9–12.7)

## 2025-04-02 PROCEDURE — 99999 PR PBB SHADOW E&M-EST. PATIENT-LVL II: CPT | Mod: PBBFAC,,,

## 2025-04-02 PROCEDURE — 84075 ASSAY ALKALINE PHOSPHATASE: CPT

## 2025-04-02 PROCEDURE — 85025 COMPLETE CBC W/AUTO DIFF WBC: CPT

## 2025-04-02 PROCEDURE — 99212 OFFICE O/P EST SF 10 MIN: CPT | Mod: PBBFAC

## 2025-04-02 PROCEDURE — 36415 COLL VENOUS BLD VENIPUNCTURE: CPT

## 2025-04-02 NOTE — PRE ADMISSION SCREENING
Pre op instructions reviewed with patient during Clinic Visit with Provider, verbalized understanding.    To confirm, Surgery is scheduled on 4/11/25. We will call you late afternoon the business day prior to surgery with your arrival time.    *Please report to the [facility] (1st Floor) located off of [facility] (2nd Entrance/Building on the left, in front of the flag pole). Address: [address] [place] [address] [place]. [id number]      INSTRUCTIONS IMPORTANT!!!  DO NOT Eat, Drink, or Smoke after 12 midnight unless instructed otherwise by your Surgeon. OK to brush teeth, no gum, candy or mints!    MORNING OF SURGERY, drink small sip of water with the following medications instructed by Pre-Admit Provider:  N?A    Diabetic Patients: If you take diabetic or weight loss medication, Do NOT take morning of surgery unless instructed by Doctor. Metformin to be stopped 24 hrs prior to surgery.     DO NOT take long-acting insulin the evening before surgery. Blood sugars will be checked in pre-op by Nurse.    If you take Ozempic/ Mounjaro / Wegovy / Trulicity / Semaglutide, any weight loss injections OR PHENTERMINE --->>> PLEASE LET US KNOW IMMEDIATELY, as these medications need to be stopped 7 days prior to surgery!    *Patients should HOLD all vitamins, herbal supplements, weight loss medication, aspirin products & NSAIDS 7 days prior to surgery, as these can thin the blood. Ok to take Tylenol.    ____  Avoid Alcoholic beverages 3 days prior to surgery, as it can thin the blood.  ____  NO Acrylic/fake nails or nail polish worn day of surgery (specifically hand/arm & foot surgeries).  ____  NO powder, lotions, deodorants, oils or cream on body.  ____  Remove all jewelry, piercings, & foreign objects prior to arrival and leave at home.  ____  Remove Dentures, Hearing Aids & Contact Lens prior to surgery.  ____  Bring photo ID and insurance information to hospital (Leave Valuables at Home).  ____  If going home  the same day, arrange for a ride home. You will not be able to drive for 24 hrs if Anesthesia was used.   ____  Females (ages 11-60): may need to give a urine sample the morning of surgery; please see Pre op Nurse prior to using the restroom.  ____  Males: Stop ED medications (Viagra, Cialis) 24 hrs prior to surgery.  ____  Wear clean, loose fitting clothing to allow for dressings/ bandages.    Bathing Instructions:    -Shower with anti-bacterial Soap (ex: Hibiclens or Dial) the night before surgery and the morning of.   -Do not use Hibiclens on your face or genitals.   -Apply clean clothes after shower.  -Do not shave your face morning of surgery   -Do not shave your body 3 days prior to surgery unless instructed otherwise by your Surgeon.    [facility]Ride Policy:  Only 2 adults allowed in pre op/recovery area during your procedure. You MUST HAVE A RIDE HOME from a responsible adult that you know and trust. Medical Transport, Uber or Lyft can ONLY be used if patient has a responsible adult to accompany them during ride home.       *Signs and symptoms of Infection Before or After Surgery:               !!!If you experience any fever, chills, nausea/ vomiting, foul odor/ excessive drainage from surgical site, flu-like symptoms, new wounds or cuts, PLEASE CALL THE SURGEON OFFICE at [phone] or SEND MESSAGE THROUGH [employer] PORTAL!!!     *If you are running late day of surgery, please call the Surgery Dept @ [phone].    *Billing question, please call  [phone]     Thank you,  -[facility] Pre Admit Dept.  [phone]   or [phone]  M-F 7:30 am-4:00 pm (Closed Major Holidays)    Additional Tests Scheduled Today:  CBC, CMP (1st  Floor) Check in at the

## 2025-04-02 NOTE — DISCHARGE INSTRUCTIONS
Weightbearing Status and Wound Care:  1. Do not intentionally place weight upon or walk on the operative extremity.    2. When ambulating, use either crutches, a Knee-Scooter (Roll-A-Bout), rolling walker, standard walker or wheelchair.    3. During daytime/awake hours, if a CAST or POSTERIOR SPLINT is in place, ice the posterior aspect of the leg, behind the knee, 20 minutes on (w/ ice) and followed by 20 minutes off (w/o ice) until follow up; repeat accordingly until follow up. IF no CAST or POSTERIOR SPLINT is in place, ice the anterior aspect of the ankle, in a similar manner. During night time/sleep hours, simply elevating the limb above the level of the heart is sufficient.     4. Elevate the extremity above the level of the heart at all times when sitting/sleeping.    5. Take the pain mediations as prescribed for the initial 3 or so days, then only as needed.    6. Start Aspirin 81mg by mouth, once or twice daily for blood clot prevention.    7. Do not change the dressings.    8. Do not get the dressings wet.      9. Please be reminded of the harmful effects of nicotine/tobacco/cigarette/cigar/marijuana smoking, especially in relation to the lower extremity, particularly in reference to post operative healing. I do rec. complete or near complete cessation of any or all of the aforementioned until you are well out of the post-operative period.    10. If you were prescribed more than one short acting opioid, e.g., (Morphine or Dilaudid), with Percocet or Norco or Tramadol, the Morphine (MSIR) or Dilaudid (Hydromorphone) is to be taken during the immediate initial days s/p, at an interval of every 6 hours or 5 hours or 4 hours, as needed for pain control. Once you are complete with the Morphine (MSIR) or Dilaudid (Hydromorphone), you may/should convert to the secondary medication. DO NOT take both medications together at any time. It is not advisable to start with the less potent medication, Percocet or Norco or  Tramadol, and then convert to the stronger medication.     11. Stool Softeners to avoid constipation should start today/tomorrow AM.    12. Laxatives or Enemas as necessary.     -----    Pre op instructions reviewed with patient during Clinic Visit with Provider, verbalized understanding.    To confirm, Surgery is scheduled on 4/11/25. We will call you late afternoon the business day prior to surgery with your arrival time.    *Please report to the [facility] (1st Floor) located off of [facility] (2nd Entrance/Building on the left, in front of the flag pole). Address: [address] [place] [address] [place]. [address]      INSTRUCTIONS IMPORTANT!!!  DO NOT Eat, Drink, or Smoke after 12 midnight unless instructed otherwise by your Surgeon. OK to brush teeth, no gum, candy or mints!    MORNING OF SURGERY, drink small sip of water with the following medications instructed by Pre-Admit Provider:  N?A    Diabetic Patients: If you take diabetic or weight loss medication, Do NOT take morning of surgery unless instructed by Doctor. Metformin to be stopped 24 hrs prior to surgery.     DO NOT take long-acting insulin the evening before surgery. Blood sugars will be checked in pre-op by Nurse.    If you take Ozempic/ Mounjaro / Wegovy / Trulicity / Semaglutide, any weight loss injections OR PHENTERMINE --->>> PLEASE LET US KNOW IMMEDIATELY, as these medications need to be stopped 7 days prior to surgery!    *Patients should HOLD all vitamins, herbal supplements, weight loss medication, aspirin products & NSAIDS 7 days prior to surgery, as these can thin the blood. Ok to take Tylenol.    ____  Avoid Alcoholic beverages 3 days prior to surgery, as it can thin the blood.  ____  NO Acrylic/fake nails or nail polish worn day of surgery (specifically hand/arm & foot surgeries).  ____  NO powder, lotions, deodorants, oils or cream on body.  ____  Remove all jewelry, piercings, & foreign objects prior to arrival and leave at  home.  ____  Remove Dentures, Hearing Aids & Contact Lens prior to surgery.  ____  Bring photo ID and insurance information to hospital (Leave Valuables at Home).  ____  If going home the same day, arrange for a ride home. You will not be able to drive for 24 hrs if Anesthesia was used.   ____  Females (ages 11-60): may need to give a urine sample the morning of surgery; please see Pre op Nurse prior to using the restroom.  ____  Males: Stop ED medications (Viagra, Cialis) 24 hrs prior to surgery.  ____  Wear clean, loose fitting clothing to allow for dressings/ bandages.    Bathing Instructions:    -Shower with anti-bacterial Soap (ex: Hibiclens or Dial) the night before surgery and the morning of.   -Do not use Hibiclens on your face or genitals.   -Apply clean clothes after shower.  -Do not shave your face morning of surgery   -Do not shave your body 3 days prior to surgery unless instructed otherwise by your Surgeon.    [facility] Policy:  Only 2 adults allowed in pre op/recovery area during your procedure. You MUST HAVE A RIDE HOME from a responsible adult that you know and trust. Medical Transport, Uber or Lyft can ONLY be used if patient has a responsible adult to accompany them during ride home.       *Signs and symptoms of Infection Before or After Surgery:               !!!If you experience any fever, chills, nausea/ vomiting, foul odor/ excessive drainage from surgical site, flu-like symptoms, new wounds or cuts, PLEASE CALL THE SURGEON OFFICE at [phone] or SEND MESSAGE THROUGH [employer] PORTAL!!!     *If you are running late day of surgery, please call the Surgery Dept @ [phone].    *Billing question, please call  [phone]     Thank you,  -[facility] Pre [facility].  [phone]   or [phone]  M-F 7:30 am-4:00 pm (Closed Major Holidays)    Additional Tests Scheduled Today:  CBC, CMP (1st  Floor) Check in at the
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
## 2025-04-10 ENCOUNTER — TELEPHONE (OUTPATIENT)
Dept: PREADMISSION TESTING | Facility: HOSPITAL | Age: 25
End: 2025-04-10
Payer: MEDICAID

## 2025-04-10 NOTE — TELEPHONE ENCOUNTER
Called and spoke with patient about the following:     Please arrive to [facility] at 8:30AM on 4/11/25 for your scheduled procedure.  Address: [address] [place] [address] [place] [address] ([address] Building on left, 1st Floor Lobby)    !!!NO FOOD after midnight! You may have clear liquids up to 3 hrs before your arrival to the Hospital!!!  Clear liquids include Gatorade, water, soda, black coffee or tea (no milk or creamer), and clear juices.  Clear liquids do NOT include anything with pulp or food particles (Chicken broth, ice cream, yogurt, Jello, etc.)    Thank you,  -[facility] Pre Admit [facility].  Mon-Fri 7:30 am - 4 pm [phone]

## 2025-04-11 ENCOUNTER — ANESTHESIA EVENT (OUTPATIENT)
Dept: SURGERY | Facility: HOSPITAL | Age: 25
End: 2025-04-11
Payer: MEDICAID

## 2025-04-11 ENCOUNTER — HOSPITAL ENCOUNTER (OUTPATIENT)
Facility: HOSPITAL | Age: 25
Discharge: HOME | End: 2025-04-11
Attending: PODIATRIST | Admitting: PODIATRIST
Payer: MEDICAID

## 2025-04-11 ENCOUNTER — ANESTHESIA (OUTPATIENT)
Dept: SURGERY | Facility: HOSPITAL | Age: 25
End: 2025-04-11
Payer: MEDICAID

## 2025-04-11 VITALS
HEART RATE: 54 BPM | OXYGEN SATURATION: 100 % | BODY MASS INDEX: 33.21 KG/M2 | HEIGHT: 68 IN | WEIGHT: 219.13 LBS | DIASTOLIC BLOOD PRESSURE: 68 MMHG | SYSTOLIC BLOOD PRESSURE: 121 MMHG | RESPIRATION RATE: 18 BRPM | TEMPERATURE: 98 F

## 2025-04-11 DIAGNOSIS — S92.252P: Primary | ICD-10-CM

## 2025-04-11 DIAGNOSIS — M19.072 OSTEOARTHRITIS OF LEFT ANKLE AND FOOT: ICD-10-CM

## 2025-04-11 DIAGNOSIS — M79.672 PAIN IN LEFT FOOT: ICD-10-CM

## 2025-04-11 PROCEDURE — 36000709 HC OR TIME LEV III EA ADD 15 MIN: Performed by: PODIATRIST

## 2025-04-11 PROCEDURE — 37000009 HC ANESTHESIA EA ADD 15 MINS: Performed by: PODIATRIST

## 2025-04-11 PROCEDURE — 37000008 HC ANESTHESIA 1ST 15 MINUTES: Performed by: PODIATRIST

## 2025-04-11 PROCEDURE — 28730 FUSION OF FOOT BONES: CPT | Mod: LT,,, | Performed by: PODIATRIST

## 2025-04-11 PROCEDURE — 63600175 PHARM REV CODE 636 W HCPCS

## 2025-04-11 PROCEDURE — 25000003 PHARM REV CODE 250

## 2025-04-11 PROCEDURE — 71000015 HC POSTOP RECOV 1ST HR: Performed by: PODIATRIST

## 2025-04-11 PROCEDURE — C1769 GUIDE WIRE: HCPCS | Performed by: PODIATRIST

## 2025-04-11 PROCEDURE — 63600175 PHARM REV CODE 636 W HCPCS: Performed by: PODIATRIST

## 2025-04-11 PROCEDURE — 63600175 PHARM REV CODE 636 W HCPCS: Performed by: NURSE ANESTHETIST, CERTIFIED REGISTERED

## 2025-04-11 PROCEDURE — 36000708 HC OR TIME LEV III 1ST 15 MIN: Performed by: PODIATRIST

## 2025-04-11 PROCEDURE — 27800903 OPTIME MED/SURG SUP & DEVICES OTHER IMPLANTS: Performed by: PODIATRIST

## 2025-04-11 PROCEDURE — 63600175 PHARM REV CODE 636 W HCPCS: Performed by: STUDENT IN AN ORGANIZED HEALTH CARE EDUCATION/TRAINING PROGRAM

## 2025-04-11 PROCEDURE — 71000033 HC RECOVERY, INTIAL HOUR: Performed by: PODIATRIST

## 2025-04-11 PROCEDURE — C1713 ANCHOR/SCREW BN/BN,TIS/BN: HCPCS | Performed by: PODIATRIST

## 2025-04-11 DEVICE — SYNTHETIC BIODEGRADABLE BONE GRAFT SUBSTITUTE CONSISTING OF NANOBONE®   GRANULATE (NON-SINTERED NANOCRYSTALLINE HYDROXYAPATITE IN A POROUS SILICA GEL MATRIX) EMBEDDED IN AN AQUEOUS GEL
Type: IMPLANTABLE DEVICE | Site: FOOT | Status: FUNCTIONAL
Brand: NANOBONE® SBX PUTTY

## 2025-04-11 RX ORDER — METHOCARBAMOL 750 MG/1
1500 TABLET, FILM COATED ORAL 2 TIMES DAILY
Qty: 56 TABLET | Refills: 0 | Status: SHIPPED | OUTPATIENT
Start: 2025-04-11 | End: 2025-04-25

## 2025-04-11 RX ORDER — HYDROMORPHONE HYDROCHLORIDE 2 MG/ML
0.2 INJECTION, SOLUTION INTRAMUSCULAR; INTRAVENOUS; SUBCUTANEOUS EVERY 5 MIN PRN
Status: DISCONTINUED | OUTPATIENT
Start: 2025-04-11 | End: 2025-04-11 | Stop reason: HOSPADM

## 2025-04-11 RX ORDER — PROPOFOL 10 MG/ML
VIAL (ML) INTRAVENOUS
Status: DISCONTINUED | OUTPATIENT
Start: 2025-04-11 | End: 2025-04-11

## 2025-04-11 RX ORDER — GLUCAGON 1 MG
1 KIT INJECTION
Status: DISCONTINUED | OUTPATIENT
Start: 2025-04-11 | End: 2025-04-11 | Stop reason: HOSPADM

## 2025-04-11 RX ORDER — KETOROLAC TROMETHAMINE 30 MG/ML
15 INJECTION, SOLUTION INTRAMUSCULAR; INTRAVENOUS EVERY 8 HOURS PRN
Status: DISCONTINUED | OUTPATIENT
Start: 2025-04-11 | End: 2025-04-11 | Stop reason: HOSPADM

## 2025-04-11 RX ORDER — CLINDAMYCIN PHOSPHATE 900 MG/50ML
900 INJECTION, SOLUTION INTRAVENOUS
Status: COMPLETED | OUTPATIENT
Start: 2025-04-11 | End: 2025-04-11

## 2025-04-11 RX ORDER — CEFADROXIL 500 MG/1
500 CAPSULE ORAL EVERY 12 HOURS
Qty: 10 CAPSULE | Refills: 0 | Status: SHIPPED | OUTPATIENT
Start: 2025-04-11 | End: 2025-04-16

## 2025-04-11 RX ORDER — BUPIVACAINE HYDROCHLORIDE 5 MG/ML
INJECTION, SOLUTION EPIDURAL; INTRACAUDAL; PERINEURAL
Status: DISCONTINUED | OUTPATIENT
Start: 2025-04-11 | End: 2025-04-11 | Stop reason: HOSPADM

## 2025-04-11 RX ORDER — DIPHENHYDRAMINE HYDROCHLORIDE 50 MG/ML
12.5 INJECTION, SOLUTION INTRAMUSCULAR; INTRAVENOUS
Status: DISCONTINUED | OUTPATIENT
Start: 2025-04-11 | End: 2025-04-11 | Stop reason: HOSPADM

## 2025-04-11 RX ORDER — FENTANYL CITRATE 50 UG/ML
INJECTION, SOLUTION INTRAMUSCULAR; INTRAVENOUS
Status: DISCONTINUED | OUTPATIENT
Start: 2025-04-11 | End: 2025-04-11

## 2025-04-11 RX ORDER — ONDANSETRON HYDROCHLORIDE 2 MG/ML
INJECTION, SOLUTION INTRAVENOUS
Status: DISCONTINUED | OUTPATIENT
Start: 2025-04-11 | End: 2025-04-11

## 2025-04-11 RX ORDER — OXYCODONE HYDROCHLORIDE 10 MG/1
10 TABLET ORAL EVERY 4 HOURS PRN
Qty: 42 TABLET | Refills: 0 | Status: SHIPPED | OUTPATIENT
Start: 2025-04-11 | End: 2025-04-17 | Stop reason: SDUPTHER

## 2025-04-11 RX ORDER — KETAMINE HCL IN 0.9 % NACL 50 MG/5 ML
SYRINGE (ML) INTRAVENOUS
Status: DISCONTINUED | OUTPATIENT
Start: 2025-04-11 | End: 2025-04-11

## 2025-04-11 RX ORDER — SODIUM CHLORIDE, SODIUM LACTATE, POTASSIUM CHLORIDE, CALCIUM CHLORIDE 600; 310; 30; 20 MG/100ML; MG/100ML; MG/100ML; MG/100ML
INJECTION, SOLUTION INTRAVENOUS CONTINUOUS PRN
Status: DISCONTINUED | OUTPATIENT
Start: 2025-04-11 | End: 2025-04-11

## 2025-04-11 RX ORDER — GABAPENTIN 100 MG/1
100 CAPSULE ORAL 2 TIMES DAILY
Qty: 60 CAPSULE | Refills: 0 | Status: SHIPPED | OUTPATIENT
Start: 2025-04-11 | End: 2025-05-11

## 2025-04-11 RX ORDER — MIDAZOLAM HYDROCHLORIDE 1 MG/ML
INJECTION INTRAMUSCULAR; INTRAVENOUS
Status: DISCONTINUED | OUTPATIENT
Start: 2025-04-11 | End: 2025-04-11

## 2025-04-11 RX ORDER — DEXMEDETOMIDINE HYDROCHLORIDE 100 UG/ML
INJECTION, SOLUTION INTRAVENOUS
Status: DISCONTINUED | OUTPATIENT
Start: 2025-04-11 | End: 2025-04-11

## 2025-04-11 RX ORDER — ONDANSETRON HYDROCHLORIDE 2 MG/ML
4 INJECTION, SOLUTION INTRAVENOUS DAILY PRN
Status: DISCONTINUED | OUTPATIENT
Start: 2025-04-11 | End: 2025-04-11 | Stop reason: HOSPADM

## 2025-04-11 RX ORDER — OXYCODONE AND ACETAMINOPHEN 5; 325 MG/1; MG/1
1 TABLET ORAL
Status: DISCONTINUED | OUTPATIENT
Start: 2025-04-11 | End: 2025-04-11 | Stop reason: HOSPADM

## 2025-04-11 RX ORDER — LIDOCAINE HYDROCHLORIDE 20 MG/ML
INJECTION INTRAVENOUS
Status: DISCONTINUED | OUTPATIENT
Start: 2025-04-11 | End: 2025-04-11

## 2025-04-11 RX ADMIN — FENTANYL CITRATE 50 MCG: 50 INJECTION, SOLUTION INTRAMUSCULAR; INTRAVENOUS at 11:04

## 2025-04-11 RX ADMIN — CLINDAMYCIN PHOSPHATE 900 MG: 900 INJECTION, SOLUTION INTRAVENOUS at 11:04

## 2025-04-11 RX ADMIN — HYDROMORPHONE HYDROCHLORIDE 0.2 MG: 2 INJECTION, SOLUTION INTRAMUSCULAR; INTRAVENOUS; SUBCUTANEOUS at 01:04

## 2025-04-11 RX ADMIN — PROPOFOL 200 MG: 10 INJECTION, EMULSION INTRAVENOUS at 11:04

## 2025-04-11 RX ADMIN — Medication 20 MG: at 11:04

## 2025-04-11 RX ADMIN — DEXMEDETOMIDINE 10 MCG: 200 INJECTION, SOLUTION INTRAVENOUS at 11:04

## 2025-04-11 RX ADMIN — ONDANSETRON 4 MG: 2 INJECTION INTRAMUSCULAR; INTRAVENOUS at 12:04

## 2025-04-11 RX ADMIN — Medication 30 MG: at 11:04

## 2025-04-11 RX ADMIN — LIDOCAINE HYDROCHLORIDE 100 MG: 20 INJECTION INTRAVENOUS at 11:04

## 2025-04-11 RX ADMIN — DEXMEDETOMIDINE 10 MCG: 200 INJECTION, SOLUTION INTRAVENOUS at 12:04

## 2025-04-11 RX ADMIN — SODIUM CHLORIDE, SODIUM LACTATE, POTASSIUM CHLORIDE, AND CALCIUM CHLORIDE: 600; 310; 30; 20 INJECTION, SOLUTION INTRAVENOUS at 11:04

## 2025-04-11 RX ADMIN — FENTANYL CITRATE 50 MCG: 50 INJECTION, SOLUTION INTRAMUSCULAR; INTRAVENOUS at 12:04

## 2025-04-11 RX ADMIN — MIDAZOLAM HYDROCHLORIDE 2 MG: 1 INJECTION, SOLUTION INTRAMUSCULAR; INTRAVENOUS at 11:04

## 2025-04-11 NOTE — ANESTHESIA PROCEDURE NOTES
Intubation    Date/Time: 4/11/2025 11:30 AM    Performed by: Macario De La Garza CRNA  Authorized by: Gino Khan MD    Intubation:     Induction:  Intravenous    Intubated:  Postinduction    Mask Ventilation:  Easy mask    Attempts:  1    Attempted By:  CRNA    Difficult Airway Encountered?: No      Complications:  None    Airway Device:  Supraglottic airway/LMA    Airway Device Size:  4.0    Style/Cuff Inflation:  Cuffed (inflated to minimal occlusive pressure)    Placement Verified By:  Capnometry    Complicating Factors:  None    Findings Post-Intubation:  BS equal bilateral

## 2025-04-11 NOTE — BRIEF OP NOTE
[facility] - Surgery (Hospital)  Brief Operative Note    Surgery Date: 4/11/2025     Surgeons and Role:     * Brown Rodriguez, DPM - Primary    Assisting Surgeon: None    Pre-op Diagnosis:  Closed displaced fracture of navicular bone of left foot with malunion, subsequent encounter [S92.252P]  Osteoarthritis of left ankle or foot [M19.072]  Pain in left foot [M79.672]    Post-op Diagnosis:  Post-Op Diagnosis Codes:     * Closed displaced fracture of navicular bone of left foot with malunion, subsequent encounter [S92.252P]     * Osteoarthritis of left ankle or foot [M19.072]     * Pain in left foot [M79.672]    Procedure(s) (LRB):  FUSION, JOINT, MIDFOOT (Left)    Anesthesia: General    Operative Findings: As per Dx.     Estimated Blood Loss: * No values recorded between 4/11/2025 11:24 AM and 4/11/2025  1:04 PM *         Specimens:   Specimen (24h ago, onward)      None            * No specimens in log *        Discharge Note    OUTCOME: Patient tolerated treatment/procedure well without complication and is now ready for discharge.    DISPOSITION: Home or Self Care    FINAL DIAGNOSIS:       * Closed displaced fracture of navicular bone of left foot with malunion, subsequent encounter [S92.252P]     * Osteoarthritis of left ankle or foot [M19.072]     * Pain in left foot [M79.672]    FOLLOWUP: In clinic    DISCHARGE INSTRUCTIONS:   Weightbearing Status and Wound Care:  1. Do not intentionally place weight upon or walk on the operative extremity.    2. When ambulating, use either crutches, a Knee-Scooter (Roll-A-Bout), rolling walker, standard walker or wheelchair.    3. During daytime/awake hours, if a CAST or POSTERIOR SPLINT is in place, ice the posterior aspect of the leg, behind the knee, 20 minutes on (w/ ice) and followed by 20 minutes off (w/o ice) until follow up; repeat accordingly until follow up. IF no CAST or POSTERIOR SPLINT is in place, ice the anterior aspect of the ankle, in a similar manner. During  night time/sleep hours, simply elevating the limb above the level of the heart is sufficient.     4. Elevate the extremity above the level of the heart at all times when sitting/sleeping.    5. Take the pain mediations as prescribed for the initial 3 or so days, then only as needed.    6. Start Aspirin 81mg by mouth, once or twice daily for blood clot prevention.    7. Do not change the dressings.    8. Do not get the dressings wet.      9. Please be reminded of the harmful effects of nicotine/tobacco/cigarette/cigar/marijuana smoking, especially in relation to the lower extremity, particularly in reference to post operative healing. I do rec. complete or near complete cessation of any or all of the aforementioned until you are well out of the post-operative period.    10. If you were prescribed more than one short acting opioid, e.g., (Morphine or Dilaudid), with Percocet or Norco or Tramadol, the Morphine (MSIR) or Dilaudid (Hydromorphone) is to be taken during the immediate initial days s/p, at an interval of every 6 hours or 5 hours or 4 hours, as needed for pain control. Once you are complete with the Morphine (MSIR) or Dilaudid (Hydromorphone), you may/should convert to the secondary medication. DO NOT take both medications together at any time. It is not advisable to start with the less potent medication, Percocet or Norco or Tramadol, and then convert to the stronger medication.     11. Stool Softeners to avoid constipation should start today/tomorrow AM.    12. Laxatives or Enemas as necessary.

## 2025-04-11 NOTE — DISCHARGE SUMMARY
[facility] (Hospital)  Discharge Note  Short Stay    Procedure(s) (LRB):  FUSION, JOINT, MIDFOOT (Left)      OUTCOME: Patient tolerated treatment/procedure well without complication and is now ready for discharge.    DISPOSITION: Home or Self Care    FINAL DIAGNOSIS:      * Closed displaced fracture of navicular bone of left foot with malunion, subsequent encounter [S92.843P]     * Osteoarthritis of left ankle or foot [M19.072]     * Pain in left foot [M79.672]    FOLLOWUP: In clinic    DISCHARGE INSTRUCTIONS:    Weightbearing Status and Wound Care:  1. Do not intentionally place weight upon or walk on the operative extremity.    2. When ambulating, use either crutches, a Knee-Scooter (Roll-A-Bout), rolling walker, standard walker or wheelchair.    3. During daytime/awake hours, if a CAST or POSTERIOR SPLINT is in place, ice the posterior aspect of the leg, behind the knee, 20 minutes on (w/ ice) and followed by 20 minutes off (w/o ice) until follow up; repeat accordingly until follow up. IF no CAST or POSTERIOR SPLINT is in place, ice the anterior aspect of the ankle, in a similar manner. During night time/sleep hours, simply elevating the limb above the level of the heart is sufficient.     4. Elevate the extremity above the level of the heart at all times when sitting/sleeping.    5. Take the pain mediations as prescribed for the initial 3 or so days, then only as needed.    6. Start Aspirin 81mg by mouth, once or twice daily for blood clot prevention.    7. Do not change the dressings.    8. Do not get the dressings wet.      9. Please be reminded of the harmful effects of nicotine/tobacco/cigarette/cigar/marijuana smoking, especially in relation to the lower extremity, particularly in reference to post operative healing. I do rec. complete or near complete cessation of any or all of the aforementioned until you are well out of the post-operative period.    10. If you were prescribed more than one short  acting opioid, e.g., (Morphine or Dilaudid), with Percocet or Norco or Tramadol, the Morphine (MSIR) or Dilaudid (Hydromorphone) is to be taken during the immediate initial days s/p, at an interval of every 6 hours or 5 hours or 4 hours, as needed for pain control. Once you are complete with the Morphine (MSIR) or Dilaudid (Hydromorphone), you may/should convert to the secondary medication. DO NOT take both medications together at any time. It is not advisable to start with the less potent medication, Percocet or Norco or Tramadol, and then convert to the stronger medication.     11. Stool Softeners to avoid constipation should start today/tomorrow AM.    12. Laxatives or Enemas as necessary.        TIME SPENT ON DISCHARGE: 10 minutes
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
## 2025-04-11 NOTE — TRANSFER OF CARE
"Anesthesia Transfer of Care Note    Patient: Paul Leal    Procedure(s) Performed: Procedure(s) (LRB):  FUSION, JOINT, MIDFOOT (Left)    Patient location: PACU    Anesthesia Type: general    Transport from OR: Transported from OR on room air with adequate spontaneous ventilation    Post pain: adequate analgesia    Post assessment: no apparent anesthetic complications    Post vital signs: stable    Level of consciousness: responds to stimulation    Nausea/Vomiting: no nausea/vomiting    Complications: none    Transfer of care protocol was followed      Last vitals: Visit Vitals  BP (!) 143/74 (BP Location: Right arm, Patient Position: Sitting)   Pulse (!) 55   Temp 36.8 °C (98.2 °F) (Temporal)   Resp 18   Ht 5' 8" (1.727 m)   Wt 99.4 kg (219 lb 2.2 oz)   SpO2 100%   BMI 33.32 kg/m²     "

## 2025-04-11 NOTE — OP NOTE
[facility]  Podiatric Medicine & Surgery  Operative Report    SUMMARY     Date of Procedure: 4/11/2025    Procedure: Procedure(s):  FUSION, JOINT, MIDFOOT    Surgeon(s) and Role: Surgeons and Role:     * Brown Rodriguez DPM - Primary    Pre-Operative Diagnosis: Pre-Op Diagnosis Codes:      * Closed displaced fracture of navicular bone of left foot with malunion, subsequent encounter [S92.252P]     * Osteoarthritis of left ankle or foot [M19.072]     * Pain in left foot [M79.672]    Post-Operative Diagnosis: Post-Op Diagnosis Codes:     * Closed displaced fracture of navicular bone of left foot with malunion, subsequent encounter [S92.252P]     * Osteoarthritis of left ankle or foot [M19.072]     * Pain in left foot [M79.672]    Anesthesia: General    Technical Procedures Used:   1. NC fusion, 1st and 2nd, LLE.    Description of the Findings of the Procedure: The patient was seen in the Holding Room. The risks, benefits, complications, treatment options, and expected outcomes were discussed with the patient. The risks and potential complications of their problem and purposed treatment include but are not limited to infection, nerve injury, vascular injury, nonunion/malunion/delayed union of the surgical site, persistent pain, potential skin necrosis, deep vein thrombosis, possible pulmonary embolus, complications of the anesthetics and failure of the implant.  The patient concurred with the proposed plan, giving informed consent. The patient is aware that the procedure may be a part of a staged collection of procedures for definitive cure and/or alleviation of symptoms. The site of surgery properly noted/marked. Preoperative intravenous antibiotics are hanging at bedside, and are currently being administered via the heparin lock. The patient was taken to Operating Suite.    Once in the operative suite, the patient is transferred onto the operative table in the supine position. A TIME-OUT  is taken as per protocol to identify the proper patient, procedure to be performed, and laterality.  The patient is properly positioned on the operating room table for ease of dissection and for any ancillary imaging.  A well-padded tourniquet was applied to the right calf.  Nursing and ancillary OR staff prepared the patient for the procedure. The patient is adequately sedated by the Attending Anesthesiologist and/or covering CRNA. Next, the operative limb was rendered sterile using chlorhexidine paint and scrub.  Sterile sheets and drapes were applied thereafter. Another TIME-OUT is taken as per protocol to identify the proper patient, procedure to be performed, and laterality.      The tourniquet is elevated to 340mmHg after the limb is exsanguinated for approximately 2 min with an Esmarch bandage.    Following this, sharp skin incision in lazy-S fashion at the dorsal midfoot at the interspace between the 2nd and 3rd navicular cuneiform articulation.  Deep dissection with a tenotomy scissor.  Electrocautery as necessitated.  The neurovascular structures are retracted. Copious lavage with NSS.  Following this, the 1st and 2nd cuneiform navicular joints are opened in standard fashion.  The cartilage was removed in standard fashion.  Subchondral drilling with a 2.5 mm drill bit.  Further copious lavage.  The joint is packed with bone allograft.    Each individual joint is compressed with the compression device.  Each joint is compressed permanently with dorsal staple with the assistance of fluoroscopy.  Further back filling with bone allograft.  Deep closure using Monocryl.  Subcuticular closure using Monocryl.  The skin is closed using Dermabond.    The cuff is deflated and CFT is normal.  There is no excessive exsanguination through the incision site.  Postoperative block is given with 30 cc of Marcaine pain.    All incisions are dressed with Xeroform/Adaptic nonadherent dressings followed by sterile 4 x 4 gauze,  abdominal pad, Sanna/Kerlix and light ACE. [place] Compression is applied w/ a posterior splint.     The anesthesia is weaned. There were no complications to this procedure. Any final necessary imaging to be performed in the PACU if it was not performed here in the OR suite.  The patient is transferred to the [facility] bed[facility]st[facility], [facility]. The patient is transferred to the PACU.    Significant Surgical Tasks Conducted by the Assistant(s), if Applicable: N/A    Complications: * No complications entered in OR log *    Estimated Blood Loss (EBL): less than 50 mL    Drains: N/A    Implants:   Implant Name Type Inv. Item Serial No.  Lot No. LRB No. Used Action   WIRE C TROCAR TIP .062 - [id number]  WIRE C TROCAR TIP .062  [employer]YS [id number] Left 1 Implanted and Explanted   WIRE C TROCAR TIP .062 - [id number]  WIRE C TROCAR TIP .062  [employer] [id number] Left 1 Implanted and Explanted   WIRE C TROCAR TIP .062 - [id number]  WIRE C TROCAR TIP .062  [employer] [id number] Left 1 Implanted and Explanted   WIRE C TROCAR TIP .062 - [id number]  WIRE C TROCAR TIP .062  DOCUSYS [id number] Left 1 Implanted and Explanted   NanoBone SBX Putty 2.5ml Synthetic Biodegradable Bone Graft Substitute    [employer] [id number] Left 1 Implanted   NanoBone SBX Putty 1.0ml Synthetic Biodegradable Bone Graft Substitute    [employer] [id number] Left 1 Implanted   Reflex Max Nitinol Staple Kit [id number] Staple with Disposable Instruments    [employer] [id number] Left 1 Implanted   Reflex Max Nitinol Staple Kit [id number] Staple with Disposable Instruments     [id number] Left 1 Implanted       Specimens: * No specimens in log *    Condition: stable    Disposition: PACU - hemodynamically stable.    Attestation: I performed the procedure.

## 2025-04-15 NOTE — ANESTHESIA POSTPROCEDURE EVALUATION
Anesthesia Post Evaluation    Patient: Paul Leal    Procedure(s) Performed: Procedure(s) (LRB):  FUSION, JOINT, MIDFOOT (Left)    Final Anesthesia Type: general      Patient location during evaluation: PACU  Patient participation: Yes- Able to Participate  Level of consciousness: awake and alert and oriented  Post-procedure vital signs: reviewed and stable  Pain management: adequate  Airway patency: patent  PATT mitigation strategies: Multimodal analgesia  PONV status at discharge: No PONV  Anesthetic complications: no      Cardiovascular status: blood pressure returned to baseline and hemodynamically stable  Respiratory status: unassisted  Hydration status: euvolemic  Follow-up not needed.              Vitals Value Taken Time   /68 04/11/25 13:45   Temp 36.7 °C (98 °F) 04/11/25 13:00   Pulse 54 04/11/25 13:50   Resp 18 04/11/25 13:50   SpO2 100 % 04/11/25 13:50         Event Time   Out of Recovery 13:48:51         Pain/Dinesh Score: No data recorded

## 2025-04-17 ENCOUNTER — TELEPHONE (OUTPATIENT)
Dept: PODIATRY | Facility: CLINIC | Age: 25
End: 2025-04-17
Payer: MEDICAID

## 2025-04-17 DIAGNOSIS — M19.072 OSTEOARTHRITIS OF LEFT ANKLE AND FOOT: ICD-10-CM

## 2025-04-17 RX ORDER — OXYCODONE HYDROCHLORIDE 10 MG/1
10 TABLET ORAL EVERY 4 HOURS PRN
Qty: 42 TABLET | Refills: 0 | Status: SHIPPED | OUTPATIENT
Start: 2025-04-17 | End: 2025-04-24

## 2025-04-17 NOTE — TELEPHONE ENCOUNTER
Number not available.  ----- Message from Miguel sent at 4/17/2025 11:57 AM CDT -----  Contact: pt @ [phone]  Name of Who is Calling: pt  What is the request in detail: calling to discuss dressing change and Rx  Can the clinic reply by [facility]: no  [facility]: [phone]

## 2025-04-23 DIAGNOSIS — M79.672 PAIN IN LEFT FOOT: ICD-10-CM

## 2025-04-23 DIAGNOSIS — M19.072 OSTEOARTHRITIS OF LEFT ANKLE AND FOOT: Primary | ICD-10-CM

## 2025-04-23 DIAGNOSIS — S92.252P: ICD-10-CM

## 2025-04-23 RX ORDER — OXYCODONE AND ACETAMINOPHEN 5; 325 MG/1; MG/1
1 TABLET ORAL EVERY 6 HOURS PRN
Qty: 20 TABLET | Refills: 0 | Status: SHIPPED | OUTPATIENT
Start: 2025-04-23 | End: 2025-04-28

## 2025-04-28 ENCOUNTER — OFFICE VISIT (OUTPATIENT)
Dept: PODIATRY | Facility: CLINIC | Age: 25
End: 2025-04-28
Payer: MEDICAID

## 2025-04-28 ENCOUNTER — PATIENT MESSAGE (OUTPATIENT)
Dept: PODIATRY | Facility: CLINIC | Age: 25
End: 2025-04-28
Payer: MEDICAID

## 2025-04-28 DIAGNOSIS — M19.072 OSTEOARTHRITIS OF LEFT ANKLE AND FOOT: ICD-10-CM

## 2025-04-28 DIAGNOSIS — E55.9 VITAMIN D DEFICIENCY: ICD-10-CM

## 2025-04-28 DIAGNOSIS — M79.672 PAIN IN LEFT FOOT: ICD-10-CM

## 2025-04-28 DIAGNOSIS — S92.252P: ICD-10-CM

## 2025-04-28 DIAGNOSIS — Z72.0 TOBACCO ABUSE: ICD-10-CM

## 2025-04-28 DIAGNOSIS — Z09 POSTOP CHECK: Primary | ICD-10-CM

## 2025-04-28 PROCEDURE — 99212 OFFICE O/P EST SF 10 MIN: CPT | Mod: PBBFAC | Performed by: PODIATRIST

## 2025-04-28 PROCEDURE — 1160F RVW MEDS BY RX/DR IN RCRD: CPT | Mod: CPTII,,, | Performed by: PODIATRIST

## 2025-04-28 PROCEDURE — 99999 PR PBB SHADOW E&M-EST. PATIENT-LVL II: CPT | Mod: PBBFAC,,, | Performed by: PODIATRIST

## 2025-04-28 PROCEDURE — 1159F MED LIST DOCD IN RCRD: CPT | Mod: CPTII,,, | Performed by: PODIATRIST

## 2025-04-28 PROCEDURE — 99024 POSTOP FOLLOW-UP VISIT: CPT | Mod: ,,, | Performed by: PODIATRIST

## 2025-04-28 RX ORDER — ERGOCALCIFEROL 1.25 MG/1
50000 CAPSULE ORAL
Qty: 4 CAPSULE | Refills: 1 | Status: SHIPPED | OUTPATIENT
Start: 2025-04-28 | End: 2025-05-20

## 2025-04-28 RX ORDER — OXYCODONE HYDROCHLORIDE 10 MG/1
10 TABLET ORAL EVERY 4 HOURS PRN
Qty: 42 TABLET | Refills: 0 | OUTPATIENT
Start: 2025-04-28 | End: 2025-05-05
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
## 2025-05-12 ENCOUNTER — OFFICE VISIT (OUTPATIENT)
Dept: PODIATRY | Facility: CLINIC | Age: 25
End: 2025-05-12
Payer: MEDICAID

## 2025-05-12 ENCOUNTER — HOSPITAL ENCOUNTER (OUTPATIENT)
Dept: RADIOLOGY | Facility: HOSPITAL | Age: 25
Discharge: HOME | End: 2025-05-12
Attending: PODIATRIST
Payer: MEDICAID

## 2025-05-12 DIAGNOSIS — Z09 POSTOP CHECK: ICD-10-CM

## 2025-05-12 DIAGNOSIS — M79.672 PAIN IN LEFT FOOT: ICD-10-CM

## 2025-05-12 DIAGNOSIS — Z09 POSTOP CHECK: Primary | ICD-10-CM

## 2025-05-12 DIAGNOSIS — S92.252P: ICD-10-CM

## 2025-05-12 DIAGNOSIS — T81.31XA SURGICAL WOUND DEHISCENCE, INITIAL ENCOUNTER: ICD-10-CM

## 2025-05-12 DIAGNOSIS — M19.072 OSTEOARTHRITIS OF LEFT ANKLE AND FOOT: ICD-10-CM

## 2025-05-12 DIAGNOSIS — L03.116 CELLULITIS OF LEFT FOOT: ICD-10-CM

## 2025-05-12 PROCEDURE — 1160F RVW MEDS BY RX/DR IN RCRD: CPT | Mod: CPTII,,, | Performed by: PODIATRIST

## 2025-05-12 PROCEDURE — 87075 CULTR BACTERIA EXCEPT BLOOD: CPT | Performed by: PODIATRIST

## 2025-05-12 PROCEDURE — 73630 X-RAY EXAM OF FOOT: CPT | Mod: 26,LT,, | Performed by: RADIOLOGY

## 2025-05-12 PROCEDURE — 99212 OFFICE O/P EST SF 10 MIN: CPT | Mod: PBBFAC,25 | Performed by: PODIATRIST

## 2025-05-12 PROCEDURE — 99024 POSTOP FOLLOW-UP VISIT: CPT | Mod: ,,, | Performed by: PODIATRIST

## 2025-05-12 PROCEDURE — 87186 SC STD MICRODIL/AGAR DIL: CPT | Mod: 59 | Performed by: PODIATRIST

## 2025-05-12 PROCEDURE — 1159F MED LIST DOCD IN RCRD: CPT | Mod: CPTII,,, | Performed by: PODIATRIST

## 2025-05-12 PROCEDURE — 99999 PR PBB SHADOW E&M-EST. PATIENT-LVL II: CPT | Mod: PBBFAC,,, | Performed by: PODIATRIST

## 2025-05-12 PROCEDURE — 73630 X-RAY EXAM OF FOOT: CPT | Mod: TC,LT

## 2025-05-12 RX ORDER — SULFAMETHOXAZOLE AND TRIMETHOPRIM 800; 160 MG/1; MG/1
1 TABLET ORAL 2 TIMES DAILY
Qty: 20 TABLET | Refills: 0 | Status: SHIPPED | OUTPATIENT
Start: 2025-05-12 | End: 2025-05-22
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
## 2025-05-15 LAB
BACTERIA SPEC AEROBE CULT: ABNORMAL
BACTERIA SPEC AEROBE CULT: ABNORMAL
BACTERIA SPEC ANAEROBE CULT: NORMAL

## 2025-05-16 ENCOUNTER — RESULTS FOLLOW-UP (OUTPATIENT)
Dept: PODIATRY | Facility: CLINIC | Age: 25
End: 2025-05-16

## 2025-05-16 ENCOUNTER — PATIENT MESSAGE (OUTPATIENT)
Dept: UROLOGY | Facility: CLINIC | Age: 25
End: 2025-05-16
Payer: MEDICAID

## 2025-05-16 DIAGNOSIS — T81.31XA SURGICAL WOUND DEHISCENCE, INITIAL ENCOUNTER: Primary | ICD-10-CM

## 2025-05-16 RX ORDER — DOXYCYCLINE 100 MG/1
100 CAPSULE ORAL EVERY 12 HOURS
Qty: 20 CAPSULE | Refills: 0 | Status: SHIPPED | OUTPATIENT
Start: 2025-05-16 | End: 2025-05-26

## 2025-05-22 ENCOUNTER — TELEPHONE (OUTPATIENT)
Dept: PODIATRY | Facility: CLINIC | Age: 25
End: 2025-05-22
Payer: MEDICAID

## 2025-05-22 DIAGNOSIS — S92.252P: ICD-10-CM

## 2025-05-22 DIAGNOSIS — M19.072 OSTEOARTHRITIS OF LEFT ANKLE AND FOOT: ICD-10-CM

## 2025-05-22 DIAGNOSIS — Z09 POSTOP CHECK: Primary | ICD-10-CM

## 2025-05-22 NOTE — TELEPHONE ENCOUNTER
Return pt call in regards to getting appt reschedule that he missed. I schedule pt to see Dr. Rodriguez on Friday May 30 @ 1:45 pm. Pt acknowledge         ----- Message from Nurse Rojas sent at 5/21/2025  4:24 PM CDT -----  Contact: Traci, [phone]    ----- Message -----  From: Manda Ibarra  Sent: 5/21/2025   3:44 PM CDT  To: Michael Dasilva Staff    .[email or web address] Patient is calling for Medical Advice regarding: Calling to reschedule the appointment he missed this morning. Please call him. Thanks.How long has patient had these symptoms: N/APharmacy name and phone#: N/APaaroldo wants a call back or thru [facility]: N/AComments: N/APlease advise patient replies from provider may take up to 48 hours.

## 2025-05-30 ENCOUNTER — TELEPHONE (OUTPATIENT)
Dept: PODIATRY | Facility: CLINIC | Age: 25
End: 2025-05-30
Payer: MEDICAID

## 2025-06-04 ENCOUNTER — TELEPHONE (OUTPATIENT)
Dept: PODIATRY | Facility: CLINIC | Age: 25
End: 2025-06-04
Payer: MEDICAID

## 2025-06-05 ENCOUNTER — HOSPITAL ENCOUNTER (OUTPATIENT)
Dept: RADIOLOGY | Facility: HOSPITAL | Age: 25
Discharge: HOME | End: 2025-06-05
Attending: PODIATRIST
Payer: MEDICAID

## 2025-06-05 ENCOUNTER — OFFICE VISIT (OUTPATIENT)
Dept: PODIATRY | Facility: CLINIC | Age: 25
End: 2025-06-05
Payer: MEDICAID

## 2025-06-05 VITALS — HEIGHT: 69 IN | BODY MASS INDEX: 29.61 KG/M2 | WEIGHT: 199.94 LBS

## 2025-06-05 DIAGNOSIS — L89.896 PRESSURE INJURY OF DEEP TISSUE OF LEFT FOOT: ICD-10-CM

## 2025-06-05 DIAGNOSIS — Z01.818 PREOP EXAM FOR INTERNAL MEDICINE: ICD-10-CM

## 2025-06-05 DIAGNOSIS — Z91.199 NONCOMPLIANCE: ICD-10-CM

## 2025-06-05 DIAGNOSIS — T81.31XD SURGICAL WOUND DEHISCENCE, SUBSEQUENT ENCOUNTER: ICD-10-CM

## 2025-06-05 DIAGNOSIS — S92.252P: ICD-10-CM

## 2025-06-05 DIAGNOSIS — Z09 POSTOP CHECK: ICD-10-CM

## 2025-06-05 DIAGNOSIS — Z09 POSTOP CHECK: Primary | ICD-10-CM

## 2025-06-05 DIAGNOSIS — M79.672 PAIN IN LEFT FOOT: ICD-10-CM

## 2025-06-05 DIAGNOSIS — M19.072 OSTEOARTHRITIS OF LEFT ANKLE AND FOOT: ICD-10-CM

## 2025-06-05 PROCEDURE — 87075 CULTR BACTERIA EXCEPT BLOOD: CPT | Performed by: PODIATRIST

## 2025-06-05 PROCEDURE — 73630 X-RAY EXAM OF FOOT: CPT | Mod: TC,LT

## 2025-06-05 PROCEDURE — 87070 CULTURE OTHR SPECIMN AEROBIC: CPT | Performed by: PODIATRIST

## 2025-06-05 PROCEDURE — 99999 PR PBB SHADOW E&M-EST. PATIENT-LVL IV: CPT | Mod: PBBFAC,,, | Performed by: PODIATRIST

## 2025-06-05 PROCEDURE — 99214 OFFICE O/P EST MOD 30 MIN: CPT | Mod: PBBFAC,25 | Performed by: PODIATRIST
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
## 2025-06-07 LAB
BACTERIA SPEC AEROBE CULT: NORMAL
BACTERIA SPEC ANAEROBE CULT: NORMAL

## 2025-06-10 LAB — BACTERIA SPEC ANAEROBE CULT: ABNORMAL

## 2025-06-11 ENCOUNTER — OFFICE VISIT (OUTPATIENT)
Dept: INTERNAL MEDICINE | Facility: CLINIC | Age: 25
End: 2025-06-11
Payer: MEDICAID

## 2025-06-11 VITALS
SYSTOLIC BLOOD PRESSURE: 125 MMHG | DIASTOLIC BLOOD PRESSURE: 81 MMHG | OXYGEN SATURATION: 98 % | TEMPERATURE: 98 F | HEART RATE: 73 BPM

## 2025-06-11 DIAGNOSIS — S92.255G CLOSED NONDISPLACED FRACTURE OF NAVICULAR BONE OF LEFT FOOT WITH DELAYED HEALING, SUBSEQUENT ENCOUNTER: Primary | ICD-10-CM

## 2025-06-11 DIAGNOSIS — Z01.818 PREOP EXAM FOR INTERNAL MEDICINE: ICD-10-CM

## 2025-06-11 LAB — BACTERIA SPEC AEROBE CULT: ABNORMAL

## 2025-06-11 PROCEDURE — 99999 PR PBB SHADOW E&M-EST. PATIENT-LVL III: CPT | Mod: PBBFAC,,,

## 2025-06-11 PROCEDURE — 99213 OFFICE O/P EST LOW 20 MIN: CPT | Mod: PBBFAC

## 2025-06-11 RX ORDER — OXYCODONE HCL 10 MG/1
10 TABLET, FILM COATED, EXTENDED RELEASE ORAL EVERY 12 HOURS
Status: ON HOLD | COMMUNITY
End: 2025-06-13 | Stop reason: HOSPADM

## 2025-06-11 NOTE — PRE ADMISSION SCREENING
Pre op instructions reviewed with patient during Clinic Visit with Provider.    To confirm, Surgery is scheduled on 6/13/25. We will call you late afternoon the business day prior to surgery with your arrival time.    *Please report to the [facility] Lobby (1st Floor) located off of [facility] (2nd Entrance/Building on the left, in front of the flag pole). Address: [address] [place] [address] [place]. [id number]      INSTRUCTIONS IMPORTANT!!!  DO NOT Eat, Drink, or Smoke after 12 midnight unless instructed otherwise by your Surgeon. OK to brush teeth, no gum, candy or mints!    MORNING OF SURGERY, drink small sip of water with the following medications instructed by Pre-Admit Provider:  none      ADHD Medication: Stop taking *ADHD Medication:  48 hrs prior to surgery, as this can affect the anesthesia used.     Diabetic Patients: If you take diabetic or weight loss medication, Do NOT take morning of surgery unless instructed by Doctor. Metformin to be stopped 24 hrs prior to surgery.     DO NOT take long-acting insulin the evening before surgery. Blood sugars will be checked in pre-op by Nurse.    If you take Ozempic/ Mounjaro / Wegovy / Trulicity / Semaglutide, any weight loss injections OR PHENTERMINE --->>> PLEASE LET US KNOW IMMEDIATELY, as these medications need to be stopped 7 days prior to surgery!    *Patients should HOLD all vitamins, herbal supplements, weight loss medication, aspirin products & NSAIDS 7 days prior to surgery, as these can thin the blood. Ok to take Tylenol.    ____  Avoid Alcoholic beverages 3 days prior to surgery, as it can thin the blood.  ____  NO Acrylic/fake nails or nail polish worn day of surgery (specifically hand/arm & foot surgeries).  ____  NO powder, lotions, deodorants, oils or cream on body.  ____  Remove all jewelry, piercings, & foreign objects prior to arrival and leave at home.  ____  Remove Dentures, Hearing Aids & Contact Lens prior to surgery.  ____  Bring  photo ID and insurance information to hospital (Leave Valuables at Home).  ____  If going home the same day, arrange for a ride home. You will not be able to drive for 24 hrs if Anesthesia was used.   ____  Males: Stop ED medications (Viagra, Cialis) 24 hrs prior to surgery.  ____  Wear clean, loose fitting clothing to allow for dressings/ bandages.    Bathing Instructions:    -Shower with anti-bacterial Soap (ex: Hibiclens or Dial) the night before surgery and the morning of.   -Do not use Hibiclens on your face or genitals.   -Apply clean clothes after shower.  -Do not shave your face morning of surgery   -Do not shave your body 3 days prior to surgery unless instructed otherwise by your Surgeon.    [facility] Vis[facility]/Ride Policy:  Only 2 adults allowed in pre op/recovery area during your procedure. You MUST HAVE A RIDE HOME from a responsible adult that you know and trust. Medical Transport, Uber or Lyft can ONLY be used if patient has a responsible adult to accompany them during ride home.       *Signs and symptoms of Infection Before or After Surgery:               !!!If you experience any fever, chills, nausea/ vomiting, foul odor/ excessive drainage from surgical site, flu-like symptoms, new wounds or cuts, PLEASE CALL THE SURGEON OFFICE at [phone] or SEND MESSAGE THROUGH [email or web address]!!!     *If you are running late day of surgery, please call the Surgery Dept @ [phone].    *Billing question, please call  [phone]     Thank you,  -[facility] Pre Admit Dept.  [phone]   or [phone]  M-F 7:30 am-4:00 pm (Closed Major Holidays)    Additional Tests Scheduled Today:   LABS (1ST Floor) Check in at the
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
## 2025-06-11 NOTE — ASSESSMENT & PLAN NOTE
Pt is s/p L midfoot fusion with would dehiscence ; treated with oral abx last dose 1 week ago ; no drainage currently   Known risk factors for perioperative complications: None      Difficulty with intubation is not anticipated.    Cardiac Risk Estimation:  Per Revised Cardiac Risk Index patient 's RCRI score is 0 with a 3.9% risk of a major cardiac event.      1.) Preoperative workup as follows: hemoglobin, hematocrit, electrolytes, creatinine, glucose, liver function studies.  2.) Change in medication regimen before surgery: none.  3.) Prophylaxis for cardiac events with perioperative beta-blockers: not indicated.  4.) Invasive hemodynamic monitoring perioperatively: not indicated.  5.) Deep vein thrombosis prophylaxis postoperatively: intermittent pneumatic compression boots and regimen to be chosen by surgical team.  6.) Surveillance for postoperative MI with ECG immediately postoperatively and on postoperati ve days 1 and 2 AND troponin levels 24 hours postoperatively and on day 4 or hospital discharge (whichever comes first): not indicated.  7.) Current medications which may produce withdrawal symptoms if withheld perioperatively: none  8.) Other measures: labs pending

## 2025-06-12 ENCOUNTER — TELEPHONE (OUTPATIENT)
Dept: PREADMISSION TESTING | Facility: HOSPITAL | Age: 25
End: 2025-06-12
Payer: MEDICAID

## 2025-06-12 NOTE — TELEPHONE ENCOUNTER
Called and spoke with patient about the following:     Please arrive to [facility] ([facility] at 12:45PM on 6/13/25 for your scheduled procedure.  Address: [address] [place] [address] [place] [id number] ([address] Building on left, 1st Floor Lobby)    !!!NO FOOD after midnight! You may have clear liquids up to 3 hrs before your arrival to the Hospital!!!  Clear liquids include Gatorade, water, soda, black coffee or tea (no milk or creamer), and clear juices.  Clear liquids do NOT include anything with pulp or food particles (Chicken broth, ice cream, yogurt, Jello, etc.)    Thank you,  -[facility] Pre Admit Dept.  Mon-Fri 7:30 am - 4 pm [phone]

## 2025-06-13 ENCOUNTER — ANESTHESIA EVENT (OUTPATIENT)
Dept: SURGERY | Facility: HOSPITAL | Age: 25
End: 2025-06-13
Payer: MEDICAID

## 2025-06-13 ENCOUNTER — HOSPITAL ENCOUNTER (OUTPATIENT)
Facility: HOSPITAL | Age: 25
Discharge: HOME | End: 2025-06-13
Attending: PODIATRIST | Admitting: PODIATRIST
Payer: MEDICAID

## 2025-06-13 ENCOUNTER — ANESTHESIA (OUTPATIENT)
Dept: SURGERY | Facility: HOSPITAL | Age: 25
End: 2025-06-13
Payer: MEDICAID

## 2025-06-13 DIAGNOSIS — M96.0 PSEUDARTHROSIS AFTER FUSION OR ARTHRODESIS: ICD-10-CM

## 2025-06-13 DIAGNOSIS — Z98.890 POSTOPERATIVE STATE: ICD-10-CM

## 2025-06-13 DIAGNOSIS — Z01.818 PREOP TESTING: Primary | ICD-10-CM

## 2025-06-13 DIAGNOSIS — S92.255G CLOSED NONDISPLACED FRACTURE OF NAVICULAR BONE OF LEFT FOOT WITH DELAYED HEALING, SUBSEQUENT ENCOUNTER: ICD-10-CM

## 2025-06-13 PROCEDURE — 37000009 HC ANESTHESIA EA ADD 15 MINS: Performed by: PODIATRIST

## 2025-06-13 PROCEDURE — 27800903 OPTIME MED/SURG SUP & DEVICES OTHER IMPLANTS: Performed by: PODIATRIST

## 2025-06-13 PROCEDURE — 36000708 HC OR TIME LEV III 1ST 15 MIN: Performed by: PODIATRIST

## 2025-06-13 PROCEDURE — 63600175 PHARM REV CODE 636 W HCPCS: Performed by: PODIATRIST

## 2025-06-13 PROCEDURE — C1713 ANCHOR/SCREW BN/BN,TIS/BN: HCPCS | Performed by: PODIATRIST

## 2025-06-13 PROCEDURE — 25000003 PHARM REV CODE 250: Performed by: STUDENT IN AN ORGANIZED HEALTH CARE EDUCATION/TRAINING PROGRAM

## 2025-06-13 PROCEDURE — 28730 FUSION OF FOOT BONES: CPT | Mod: 78,LT,, | Performed by: PODIATRIST

## 2025-06-13 PROCEDURE — C1769 GUIDE WIRE: HCPCS | Performed by: PODIATRIST

## 2025-06-13 PROCEDURE — 15275 SKIN SUB GRAFT FACE/NK/HF/G: CPT | Mod: 78,,, | Performed by: PODIATRIST

## 2025-06-13 PROCEDURE — 71000015 HC POSTOP RECOV 1ST HR: Performed by: PODIATRIST

## 2025-06-13 PROCEDURE — 63600175 PHARM REV CODE 636 W HCPCS: Performed by: STUDENT IN AN ORGANIZED HEALTH CARE EDUCATION/TRAINING PROGRAM

## 2025-06-13 PROCEDURE — 71000033 HC RECOVERY, INTIAL HOUR: Performed by: PODIATRIST

## 2025-06-13 PROCEDURE — 36000709 HC OR TIME LEV III EA ADD 15 MIN: Performed by: PODIATRIST

## 2025-06-13 PROCEDURE — 27201423 OPTIME MED/SURG SUP & DEVICES STERILE SUPPLY: Performed by: PODIATRIST

## 2025-06-13 PROCEDURE — 63600175 PHARM REV CODE 636 W HCPCS: Performed by: ANESTHESIOLOGY

## 2025-06-13 PROCEDURE — 37000008 HC ANESTHESIA 1ST 15 MINUTES: Performed by: PODIATRIST

## 2025-06-13 RX ORDER — LIDOCAINE HYDROCHLORIDE 20 MG/ML
INJECTION INTRAVENOUS
Status: DISCONTINUED | OUTPATIENT
Start: 2025-06-13 | End: 2025-06-13

## 2025-06-13 RX ORDER — DICLOFENAC SODIUM 75 MG/1
75 TABLET, DELAYED RELEASE ORAL 2 TIMES DAILY
Qty: 60 TABLET | Refills: 0 | Status: SHIPPED | OUTPATIENT
Start: 2025-06-13 | End: 2025-07-13

## 2025-06-13 RX ORDER — SODIUM CHLORIDE, SODIUM LACTATE, POTASSIUM CHLORIDE, CALCIUM CHLORIDE 600; 310; 30; 20 MG/100ML; MG/100ML; MG/100ML; MG/100ML
INJECTION, SOLUTION INTRAVENOUS CONTINUOUS PRN
Status: DISCONTINUED | OUTPATIENT
Start: 2025-06-13 | End: 2025-06-13

## 2025-06-13 RX ORDER — OXYCODONE HYDROCHLORIDE 10 MG/1
10 TABLET ORAL EVERY 4 HOURS PRN
Qty: 42 TABLET | Refills: 0 | Status: SHIPPED | OUTPATIENT
Start: 2025-06-13 | End: 2025-06-19 | Stop reason: SDUPTHER

## 2025-06-13 RX ORDER — GABAPENTIN 100 MG/1
100 CAPSULE ORAL 2 TIMES DAILY
Qty: 60 CAPSULE | Refills: 0 | Status: SHIPPED | OUTPATIENT
Start: 2025-06-13 | End: 2025-07-13

## 2025-06-13 RX ORDER — HYDROMORPHONE HYDROCHLORIDE 2 MG/ML
0.2 INJECTION, SOLUTION INTRAMUSCULAR; INTRAVENOUS; SUBCUTANEOUS EVERY 5 MIN PRN
Status: DISCONTINUED | OUTPATIENT
Start: 2025-06-13 | End: 2025-06-13 | Stop reason: HOSPADM

## 2025-06-13 RX ORDER — CLINDAMYCIN HYDROCHLORIDE 300 MG/1
300 CAPSULE ORAL EVERY 8 HOURS
Qty: 30 CAPSULE | Refills: 0 | Status: SHIPPED | OUTPATIENT
Start: 2025-06-13 | End: 2025-06-23

## 2025-06-13 RX ORDER — MIDAZOLAM HYDROCHLORIDE 1 MG/ML
INJECTION INTRAMUSCULAR; INTRAVENOUS
Status: DISCONTINUED | OUTPATIENT
Start: 2025-06-13 | End: 2025-06-13

## 2025-06-13 RX ORDER — OXYCODONE AND ACETAMINOPHEN 5; 325 MG/1; MG/1
1 TABLET ORAL
Status: DISCONTINUED | OUTPATIENT
Start: 2025-06-13 | End: 2025-06-13 | Stop reason: HOSPADM

## 2025-06-13 RX ORDER — PROPOFOL 10 MG/ML
VIAL (ML) INTRAVENOUS
Status: DISCONTINUED | OUTPATIENT
Start: 2025-06-13 | End: 2025-06-13

## 2025-06-13 RX ORDER — LEVOFLOXACIN 750 MG/1
750 TABLET, FILM COATED ORAL DAILY
Qty: 10 TABLET | Refills: 0 | Status: SHIPPED | OUTPATIENT
Start: 2025-06-13 | End: 2025-06-23

## 2025-06-13 RX ORDER — CEFAZOLIN SODIUM 1 G/3ML
INJECTION, POWDER, FOR SOLUTION INTRAMUSCULAR; INTRAVENOUS
Status: DISCONTINUED | OUTPATIENT
Start: 2025-06-13 | End: 2025-06-13

## 2025-06-13 RX ORDER — FENTANYL CITRATE 50 UG/ML
INJECTION, SOLUTION INTRAMUSCULAR; INTRAVENOUS
Status: DISCONTINUED | OUTPATIENT
Start: 2025-06-13 | End: 2025-06-13

## 2025-06-13 RX ORDER — ONDANSETRON HYDROCHLORIDE 2 MG/ML
4 INJECTION, SOLUTION INTRAVENOUS DAILY PRN
Status: DISCONTINUED | OUTPATIENT
Start: 2025-06-13 | End: 2025-06-13 | Stop reason: HOSPADM

## 2025-06-13 RX ORDER — BUPIVACAINE HYDROCHLORIDE 5 MG/ML
INJECTION, SOLUTION EPIDURAL; INTRACAUDAL; PERINEURAL
Status: DISCONTINUED | OUTPATIENT
Start: 2025-06-13 | End: 2025-06-13 | Stop reason: HOSPADM

## 2025-06-13 RX ORDER — GLUCAGON 1 MG
1 KIT INJECTION
Status: DISCONTINUED | OUTPATIENT
Start: 2025-06-13 | End: 2025-06-13 | Stop reason: HOSPADM

## 2025-06-13 RX ADMIN — PROPOFOL 150 MG: 10 INJECTION, EMULSION INTRAVENOUS at 04:06

## 2025-06-13 RX ADMIN — HYDROMORPHONE HYDROCHLORIDE 0.2 MG: 2 INJECTION, SOLUTION INTRAMUSCULAR; INTRAVENOUS; SUBCUTANEOUS at 06:06

## 2025-06-13 RX ADMIN — SODIUM CHLORIDE, SODIUM LACTATE, POTASSIUM CHLORIDE, AND CALCIUM CHLORIDE: 600; 310; 30; 20 INJECTION, SOLUTION INTRAVENOUS at 04:06

## 2025-06-13 RX ADMIN — MIDAZOLAM HYDROCHLORIDE 2 MG: 1 INJECTION, SOLUTION INTRAMUSCULAR; INTRAVENOUS at 04:06

## 2025-06-13 RX ADMIN — PROPOFOL 50 MG: 10 INJECTION, EMULSION INTRAVENOUS at 05:06

## 2025-06-13 RX ADMIN — LIDOCAINE HYDROCHLORIDE 100 MG: 20 INJECTION INTRAVENOUS at 04:06

## 2025-06-13 RX ADMIN — OXYCODONE HYDROCHLORIDE AND ACETAMINOPHEN 1 TABLET: 5; 325 TABLET ORAL at 05:06

## 2025-06-13 RX ADMIN — FENTANYL CITRATE 200 MCG: 50 INJECTION, SOLUTION INTRAMUSCULAR; INTRAVENOUS at 04:06

## 2025-06-13 RX ADMIN — CEFAZOLIN 2 G: 330 INJECTION, POWDER, FOR SOLUTION INTRAMUSCULAR; INTRAVENOUS at 04:06

## 2025-06-13 RX ADMIN — HYDROMORPHONE HYDROCHLORIDE 0.2 MG: 2 INJECTION, SOLUTION INTRAMUSCULAR; INTRAVENOUS; SUBCUTANEOUS at 05:06
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
## 2025-06-13 NOTE — ANESTHESIA PROCEDURE NOTES
Intubation    Date/Time: 6/13/2025 4:23 PM    Performed by: Christopher Chan CRNA  Authorized by: Gino Khan MD    Intubation:     Induction:  Intravenous    Intubated:  Postinduction    Mask Ventilation:  Easy mask    Attempts:  1    Attempted By:  CRNA    Difficult Airway Encountered?: No      Complications:  None    Airway Device:  Supraglottic airway/LMA    Airway Device Size:  4.0    Style/Cuff Inflation:  Uncuffed    Placement Verified By:  Capnometry    Complicating Factors:  None    Findings Post-Intubation:  BS equal bilateral

## 2025-06-13 NOTE — PLAN OF CARE
Patient resting & waiting on procedure. Family will  after surgery is over. Patient's belongs secured in locker. Patient instructed on pre-op process and verbalizes understanding.

## 2025-06-14 NOTE — OP NOTE
[facility]  Podiatric Medicine & Surgery  Operative Report    SUMMARY     Date of Procedure: 6/13/2025    Procedure: Procedure(s):  REMOVAL, HARDWARE, FOOT  FUSION, JOINT, MIDFOOT  APPLICATION OF DERMAL ALLOGRAFT    Surgeon(s) and Role: Surgeons and Role:     * Brown Rodriguez, DPM - Primary    Pre-Operative Diagnosis: Pre-Op Diagnosis Codes:      * Closed displaced fracture of navicular bone of left foot with malunion, subsequent encounter [S92.252P]     * Surgical wound dehiscence, subsequent encounter [T81.31XD]     * Noncompliance [Z91.199]     * Pressure injury of deep tissue of left foot [L89.896]     * Instability, fusion site.    Post-Operative Diagnosis: Post-Op Diagnosis Codes:     * Closed displaced fracture of navicular bone of left foot with malunion, subsequent encounter [S92.252P]     * Surgical wound dehiscence, subsequent encounter [T81.31XD]     * Noncompliance [Z91.199]     * Pressure injury of deep tissue of left foot [L89.896]     * Instability, fusion site.    Anesthesia: General    Technical Procedures Used:   1. Removal of hardware LLE.   2. Revision of midfoot fusion, LLE.   3. Skin allograft application, LLE.    Description of the Findings of the Procedure: The patient was seen in the Holding Room. The risks, benefits, complications, treatment options, and expected outcomes were discussed with the patient. Patient admits to prior non-compliance regarding WB, RICE therapy and PO Abx. The risks and potential complications of their problem and purposed treatment include but are not limited to infection, nerve injury, vascular injury, nonunion/malunion/delayed union of the surgical site, persistent pain, potential skin necrosis, deep vein thrombosis, possible pulmonary embolus, complications of the anesthetics and failure of the implant.  The patient concurred with the proposed plan, giving informed consent. The patient is aware that the procedure may be a part of a  staged collection of procedures for definitive cure and/or alleviation of symptoms. The site of surgery properly noted/marked. Preoperative intravenous antibiotics are hanging at bedside, and are currently being administered via the heparin lock. The patient was taken to Operating Suite.    Once in the operative suite, the patient is transferred onto the operative table in the supine position. A TIME-OUT is taken as per protocol to identify the proper patient, procedure to be performed, and laterality.  The patient is properly positioned on the operating room table for ease of dissection and for any ancillary imaging.  A well-padded tourniquet was applied to the left calf.  Nursing and ancillary OR staff prepared the patient for the procedure. The patient is adequately sedated by the Attending Anesthesiologist and/or covering CRNA. Next, the operative limb was rendered sterile using chlorhexidine paint and scrub.  Sterile sheets and drapes were applied thereafter. Another TIME-OUT is taken as per protocol to identify the proper patient, procedure to be performed, and laterality.      The tourniquet is elevated to 250mmHg after the limb is exsanguinated for approximately 2 min with an Esmarch bandage.    Following this, sharp skin incision atop the area of staple fixation and hardware.  Deep dissection with tenotomy scissor.  Electrocautery as necessitated.  Neurovascular structures are retracted.  With the assistance of fluoroscopy, the screw construct(s)/plate fixation/staple fixation are triangulated.  The hardware at the 1st NC (navicular cuneiform joints) is removed in toto.  Live/stress fluoroscopy across the joint line under magnification. Of note, minimal movement is noted.    Following this the wound at the incision site is debride with a scalpel blade and a large bone rongeur.  The debridement was excisional and did include removal of all non-viable skin and soft tissues; necrotic skin/tissue formation. The  woundbase/wound bed was also debrided to encourage bleeding as to promote/stimulate healing. Debridement was excisional and was to and including the deepest depth of tendon.     Following this, the 1st NC joint is entered and prepped with an osteotome. The 1st intercuneiform joint is debrided and prepared in similar fashion. These joints are backfill with bone allograft.    Following this, fixation of the 1st NC and intercuneiform space with a combination of compression screws using XRAY. Copious lavage with NSS. Deep closure with Monocryl.    Following this, application of Stravix allograft deep within the wound/cavity, atop the deep soft tissue/fascia/bone. The deep allograft is secured to the adjacent tissues with Monocryl sutures. The graft is fenestrated. Next, a subsequent graft is applied atop the area and does extend on to the distal and proximal aspects of the incision. The graft is fenestrated and is secured with Monocryl.     Post-op block is given. All incisions are dressed with Xeroform/Adaptic nonadherent dressings followed by sterile 4 x 4 gauze, abdominal pad, Sanna/Kerlix and light ACE. [place] Compression is applied. The cuff is deflated and CFT is WNL.      The anesthesia is weaned. There were no complications to this procedure. Any final necessary imaging to be performed in the PACU if it was not performed here in the OR suite.  The patient is transferred to the [facility] bed/stretcher, [facility]. The patient is transferred to the PACU.    Significant Surgical Tasks Conducted by the Assistant(s), if Applicable: N/A    Complications: * No complications entered in OR log *    Estimated Blood Loss (EBL): Minimal    Drains: N/A    Implants:   Implant Name Type Inv. Item Serial No.  Lot No. LRB No. Used Action   [id number]   [id number]  [id number]  1 Implanted   Reflex Max Nitinol Staple Kit [id number] Staple with Disposable Instruments    [employer] [id number] Left 1 Explanted   Reflex  Max Nitinol Staple Kit [id number] Staple with Disposable Instruments     [id number] Left 1 Explanted   SCREW 32MM 4MM ST [employer] HD BN - [id number]  SCREW 32MM 4MM ST [employer] HD BN  [employer]  Left 1 Implanted and Explanted   SCREW 30MM 4MM ST CNN HD BN - [id number]  SCREW 30MM 4MM ST CNN HD BN  [employer]  Left 1 Implanted and Explanted   SCREW 36MM 4MM ST [employer] HD BN      Left 1 Implanted and Explanted   NanoBone QD 5.0 mL     [id number] Left 1 Implanted   4.0 x 42 mm, headless, cannulated, long thread screw      Left 1 Implanted   KWIRE SMTH TRCR TIP [id number] - [id number]  KWIRE SMTH TRCR TIP [id number]  [employer]  Left 3 Implanted and Explanted       Specimens: * No specimens in log *    Condition: stable    Disposition: PACU - hemodynamically stable.    Attestation: I performed the procedure.

## 2025-06-14 NOTE — BRIEF OP NOTE
[facility] - Surgery (Hospital)  Brief Operative Note    Surgery Date: 6/13/2025     Surgeons and Role:     * Brown Rodriguez, DPM - Primary    Assisting Surgeon: None    Pre-op Diagnosis:  Closed displaced fracture of navicular bone of left foot with malunion, subsequent encounter [S92.252P]  Surgical wound dehiscence, subsequent encounter [T81.31XD]  Noncompliance [Z91.199]  Pressure injury of deep tissue of left foot [L89.896]    Post-op Diagnosis:  Post-Op Diagnosis Codes:     * Closed displaced fracture of navicular bone of left foot with malunion, subsequent encounter [S92.252P]     * Surgical wound dehiscence, subsequent encounter [T81.31XD]     * Noncompliance [Z91.199]     * Pressure injury of deep tissue of left foot [L89.896]    Procedure(s) (LRB):  REMOVAL, HARDWARE, FOOT (Left)  FUSION, JOINT, MIDFOOT (Left)  APPLICATION OF DERMAL ALLOGRAFT (Left)    Anesthesia: General    Operative Findings: Pseudoarthrosis, 1st NC. No gross OM.    Estimated Blood Loss: 10 mL         Specimens:   Specimen (24h ago, onward)      None            * No specimens in log *        Discharge Note    OUTCOME: Patient tolerated treatment/procedure well without complication and is now ready for discharge.    DISPOSITION: Home or Self Care    FINAL DIAGNOSIS:       * Closed displaced fracture of navicular bone of left foot with malunion, subsequent encounter [S92.252P]     * Surgical wound dehiscence, subsequent encounter [T81.31XD]     * Noncompliance [Z91.199]     * Pressure injury of deep tissue of left foot [L89.896]     * Instability, fusion site.    FOLLOWUP: In clinic    DISCHARGE INSTRUCTIONS:    Discharge Procedure Orders   Comprehensive Metabolic Panel   Standing Status: Future Number of Occurrences: 1 Standing Exp. Date: 08/10/26     CBC Auto Differential   Standing Status: Future Number of Occurrences: 1 Standing Exp. Date: 08/10/26     Order Specific Question Answer Comments   Send normal result to authorizing provider's  In Basket if patient is active on MyChart: Yes      EKG 12-lead   Standing Status: Future Standing Exp. Date: 06/11/26        Clinical Reference Documents Added to Patient Instructions         Document    CLINDAMYCIN (SYSTEMIC), ADULT (ENGLISH)    DICLOFENAC (SYSTEMIC), ADULT (ENGLISH)    GABAPENTIN, ADULT (ENGLISH)    LEVOFLOXACIN (SYSTEMIC), ADULT (ENGLISH)    OXYCODONE, ADULT (ENGLISH)

## 2025-06-14 NOTE — DISCHARGE SUMMARY
[facility] - Surgery (Hospital)  Discharge Note  Short Stay    Procedure(s) (LRB):  REMOVAL, HARDWARE, FOOT (Left)  FUSION, JOINT, MIDFOOT (Left)  APPLICATION OF DERMAL ALLOGRAFT (Left)      OUTCOME: Patient tolerated treatment/procedure well without complication and is now ready for discharge.    DISPOSITION: Home or Self Care    FINAL DIAGNOSIS:       * Closed displaced fracture of navicular bone of left foot with malunion, subsequent encounter [S92.252P]     * Surgical wound dehiscence, subsequent encounter [T81.31XD]     * Noncompliance [Z91.199]     * Pressure injury of deep tissue of left foot [L89.896]     * Instability, fusion site.    FOLLOWUP: In clinic    DISCHARGE INSTRUCTIONS:    Discharge Procedure Orders   Comprehensive Metabolic Panel   Standing Status: Future Number of Occurrences: 1 Standing Exp. Date: 08/10/26     CBC Auto Differential   Standing Status: Future Number of Occurrences: 1 Standing Exp. Date: 08/10/26     Order Specific Question Answer Comments   Send normal result to authorizing provider's In Basket if patient is active on MyChart: Yes      EKG 12-lead   Standing Status: Future Standing Exp. Date: 06/11/26         Clinical Reference Documents Added to Patient Instructions         Document    CLINDAMYCIN (SYSTEMIC), ADULT (ENGLISH)    DICLOFENAC (SYSTEMIC), ADULT (ENGLISH)    GABAPENTIN, ADULT (ENGLISH)    LEVOFLOXACIN (SYSTEMIC), ADULT (ENGLISH)    OXYCODONE, ADULT (ENGLISH)            TIME SPENT ON DISCHARGE: 10 minutes

## 2025-06-17 VITALS
SYSTOLIC BLOOD PRESSURE: 130 MMHG | OXYGEN SATURATION: 100 % | DIASTOLIC BLOOD PRESSURE: 64 MMHG | TEMPERATURE: 98 F | HEART RATE: 67 BPM | BODY MASS INDEX: 29.61 KG/M2 | WEIGHT: 199.94 LBS | RESPIRATION RATE: 22 BRPM | HEIGHT: 69 IN

## 2025-06-17 NOTE — ANESTHESIA POSTPROCEDURE EVALUATION
Anesthesia Post Evaluation    Patient: Paul Leal    Procedure(s) Performed: Procedure(s) (LRB):  REMOVAL, HARDWARE, FOOT (Left)  FUSION, JOINT, MIDFOOT (Left)  APPLICATION OF DERMAL ALLOGRAFT (Left)    Final Anesthesia Type: general      Patient location during evaluation: PACU  Patient participation: Yes- Able to Participate  Level of consciousness: awake and alert and oriented  Post-procedure vital signs: reviewed and stable  Pain management: adequate  Airway patency: patent  PATT mitigation strategies: Multimodal analgesia  PONV status at discharge: No PONV  Anesthetic complications: no      Cardiovascular status: blood pressure returned to baseline and hemodynamically stable  Respiratory status: unassisted  Hydration status: euvolemic  Follow-up not needed.              Vitals Value Taken Time   /64 06/13/25 18:25   Temp 36.8 °C (98.3 °F) 06/13/25 18:25   Pulse 70 06/13/25 18:25   Resp 22 06/13/25 18:25   SpO2 94 % 06/13/25 18:25   Vitals shown include unfiled device data.      Event Time   Out of Recovery 18:30:00         Pain/Dinesh Score: No data recorded

## 2025-06-19 ENCOUNTER — OFFICE VISIT (OUTPATIENT)
Dept: PODIATRY | Facility: CLINIC | Age: 25
End: 2025-06-19
Payer: MEDICAID

## 2025-06-19 VITALS — HEIGHT: 69 IN | WEIGHT: 199.94 LBS | BODY MASS INDEX: 29.61 KG/M2

## 2025-06-19 DIAGNOSIS — S92.252P: ICD-10-CM

## 2025-06-19 DIAGNOSIS — M96.0 PSEUDARTHROSIS AFTER FUSION OR ARTHRODESIS: ICD-10-CM

## 2025-06-19 DIAGNOSIS — M19.072 OSTEOARTHRITIS OF LEFT ANKLE AND FOOT: ICD-10-CM

## 2025-06-19 DIAGNOSIS — S92.255G CLOSED NONDISPLACED FRACTURE OF NAVICULAR BONE OF LEFT FOOT WITH DELAYED HEALING, SUBSEQUENT ENCOUNTER: ICD-10-CM

## 2025-06-19 DIAGNOSIS — Z78.9 UNDER CARE OF PAIN MANAGEMENT SPECIALIST: ICD-10-CM

## 2025-06-19 DIAGNOSIS — T81.31XD SURGICAL WOUND DEHISCENCE, SUBSEQUENT ENCOUNTER: ICD-10-CM

## 2025-06-19 DIAGNOSIS — Z98.890 POSTOPERATIVE STATE: ICD-10-CM

## 2025-06-19 DIAGNOSIS — Z09 POSTOP CHECK: Primary | ICD-10-CM

## 2025-06-19 DIAGNOSIS — M79.672 PAIN IN LEFT FOOT: ICD-10-CM

## 2025-06-19 PROCEDURE — 99213 OFFICE O/P EST LOW 20 MIN: CPT | Mod: PBBFAC | Performed by: PODIATRIST

## 2025-06-19 PROCEDURE — 99024 POSTOP FOLLOW-UP VISIT: CPT | Mod: ,,, | Performed by: PODIATRIST

## 2025-06-19 PROCEDURE — 99999 PR PBB SHADOW E&M-EST. PATIENT-LVL III: CPT | Mod: PBBFAC,,, | Performed by: PODIATRIST

## 2025-06-19 PROCEDURE — 1159F MED LIST DOCD IN RCRD: CPT | Mod: CPTII,,, | Performed by: PODIATRIST

## 2025-06-19 PROCEDURE — 1160F RVW MEDS BY RX/DR IN RCRD: CPT | Mod: CPTII,,, | Performed by: PODIATRIST

## 2025-06-19 RX ORDER — ERGOCALCIFEROL 1.25 MG/1
50000 CAPSULE ORAL
Qty: 4 CAPSULE | Refills: 2 | Status: SHIPPED | OUTPATIENT
Start: 2025-06-19 | End: 2025-07-11

## 2025-06-19 RX ORDER — OXYCODONE HYDROCHLORIDE 10 MG/1
10 TABLET ORAL EVERY 4 HOURS PRN
Qty: 42 TABLET | Refills: 0 | Status: SHIPPED | OUTPATIENT
Start: 2025-06-19 | End: 2025-06-26
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
## 2025-06-30 ENCOUNTER — TELEPHONE (OUTPATIENT)
Dept: PODIATRY | Facility: CLINIC | Age: 25
End: 2025-06-30
Payer: MEDICAID

## 2025-06-30 ENCOUNTER — OFFICE VISIT (OUTPATIENT)
Dept: PODIATRY | Facility: CLINIC | Age: 25
End: 2025-06-30
Payer: MEDICAID

## 2025-06-30 ENCOUNTER — HOSPITAL ENCOUNTER (OUTPATIENT)
Dept: RADIOLOGY | Facility: HOSPITAL | Age: 25
Discharge: HOME | End: 2025-06-30
Attending: PODIATRIST
Payer: MEDICAID

## 2025-06-30 ENCOUNTER — TELEPHONE (OUTPATIENT)
Dept: PODIATRY | Facility: CLINIC | Age: 25
End: 2025-06-30

## 2025-06-30 VITALS — BODY MASS INDEX: 29.61 KG/M2 | HEIGHT: 69 IN | WEIGHT: 199.94 LBS

## 2025-06-30 DIAGNOSIS — S92.252P: ICD-10-CM

## 2025-06-30 DIAGNOSIS — M19.072 OSTEOARTHRITIS OF LEFT ANKLE AND FOOT: ICD-10-CM

## 2025-06-30 DIAGNOSIS — M96.0 PSEUDARTHROSIS AFTER FUSION OR ARTHRODESIS: ICD-10-CM

## 2025-06-30 DIAGNOSIS — Z09 POSTOP CHECK: ICD-10-CM

## 2025-06-30 DIAGNOSIS — Z09 POSTOP CHECK: Primary | ICD-10-CM

## 2025-06-30 DIAGNOSIS — Z78.9 UNDER CARE OF PAIN MANAGEMENT SPECIALIST: ICD-10-CM

## 2025-06-30 DIAGNOSIS — M79.672 PAIN IN LEFT FOOT: ICD-10-CM

## 2025-06-30 PROCEDURE — 87070 CULTURE OTHR SPECIMN AEROBIC: CPT | Performed by: PODIATRIST

## 2025-06-30 PROCEDURE — 1159F MED LIST DOCD IN RCRD: CPT | Mod: CPTII,,, | Performed by: PODIATRIST

## 2025-06-30 PROCEDURE — 99999 PR PBB SHADOW E&M-EST. PATIENT-LVL III: CPT | Mod: PBBFAC,,, | Performed by: PODIATRIST

## 2025-06-30 PROCEDURE — 99213 OFFICE O/P EST LOW 20 MIN: CPT | Mod: PBBFAC,25 | Performed by: PODIATRIST

## 2025-06-30 PROCEDURE — 87075 CULTR BACTERIA EXCEPT BLOOD: CPT | Performed by: PODIATRIST

## 2025-06-30 PROCEDURE — 99024 POSTOP FOLLOW-UP VISIT: CPT | Mod: ,,, | Performed by: PODIATRIST

## 2025-06-30 PROCEDURE — 73630 X-RAY EXAM OF FOOT: CPT | Mod: TC,LT

## 2025-06-30 PROCEDURE — 73630 X-RAY EXAM OF FOOT: CPT | Mod: 26,LT,, | Performed by: RADIOLOGY

## 2025-06-30 RX ORDER — OXYCODONE HYDROCHLORIDE 5 MG/1
5 CAPSULE ORAL EVERY 4 HOURS PRN
Qty: 42 CAPSULE | Refills: 0 | Status: SHIPPED | OUTPATIENT
Start: 2025-06-30 | End: 2025-06-30

## 2025-06-30 RX ORDER — OXYCODONE HYDROCHLORIDE 5 MG/1
5 CAPSULE ORAL EVERY 4 HOURS PRN
Qty: 42 CAPSULE | Refills: 0 | Status: SHIPPED | OUTPATIENT
Start: 2025-06-30 | End: 2025-07-08

## 2025-06-30 NOTE — TELEPHONE ENCOUNTER
Return pt call but phone is not in service     Copied from CRM #[id number]. Topic: General Inquiry - Information Request  >> Jun 30, 2025  4:49 PM Lisa wrote:  ..Type:  Patient Requesting Call    Who Called: pt  Does the patient know what this is regarding?: Requesting call from nurse to discuss 6/30 visit   Would the patient rather a call back or a response via [facility]?  call  Best Call Back Number: [phone]   Additional Information:

## 2025-06-30 NOTE — TELEPHONE ENCOUNTER
Spoke with pt, he states the medication sent over isn't at the pharmacy. I asked will he want us to send it to another pharmacy, he states no tell him to change the dosage, I told him he did he wants you with the 5mg instead of 10mg. Pt started to be rude and disrespectful on the phone and I kindly disconnect the phone and let provider know what's going on.

## 2025-06-30 NOTE — TELEPHONE ENCOUNTER
Tried calling the patient. Number listed wasn't a working number.     Copied from CRM #[id number]. Topic: General Inquiry - Information Request  >> Jun 30, 2025  4:49 PM Lisa wrote:  ..Type:  Patient Requesting Call    Who Called: pt  Does the patient know what this is regarding?: Requesting call from nurse to discuss 6/30 visit   Would the patient rather a call back or a response via [facility]?  call  Best Call Back Number: [phone]   Additional Information:
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
## 2025-07-01 ENCOUNTER — TELEPHONE (OUTPATIENT)
Dept: PODIATRY | Facility: CLINIC | Age: 25
End: 2025-07-01
Payer: MEDICAID

## 2025-07-01 NOTE — TELEPHONE ENCOUNTER
Patient extremely rude requesting 10mg tablets instead of 5mg. Informed patient he would not receive the 10mg only the 5mg. Patient continued to be rude and disgraceful stating yall need to send it to a different pharmacy. Informed patient the prescription was sent yesterday evening to [facility]. Call ended pleasantly.     Copied from CRM #[id number]. Topic: Medications - Medication Authorization  >> Jul 1, 2025  9:41 AM Analy wrote:  .Type:  RX Refill Request    Who Called: .Paul Leal  Refill or New Rx: NEW  RX Name and Strength:oxyCODONE (OXY-IR) 5 mg Cap  How is the patient currently taking it? (ex. 1XDay):1 X DAY   Is this a 30 day or 90 day RX: 30  Preferred Pharmacy with phone number: .  [id number] - [place], [place] - [address]  [address] [address] [address]  Phone: [phone] Fax: [phone]    Local or Mail Order:LOCAL  Ordering Provider: TRACE  Would the patient rather a call back or a response via [facility]? CALL BACK  Best Call Back Number:.[phone]  Additional Information: PT IS REQUESTING TO HAVE A 10 MG TABLET INSTEAD OF THE 5 MG TABLE DUE TO THE PHARMACY NOT HAVING THE 5 MG TABLET. PT STATES HE RECEIVED INFORMATION ABOUT HAVING A AUTHORIZATION SENT OVER FROM THE INSURANCE ABOUT THIS SPECIFIC MEDICATION

## 2025-07-03 ENCOUNTER — PATIENT MESSAGE (OUTPATIENT)
Dept: PODIATRY | Facility: CLINIC | Age: 25
End: 2025-07-03
Payer: MEDICAID

## 2025-07-03 DIAGNOSIS — M79.672 PAIN IN LEFT FOOT: ICD-10-CM

## 2025-07-03 DIAGNOSIS — Z09 POSTOP CHECK: ICD-10-CM

## 2025-07-03 DIAGNOSIS — T81.31XD SURGICAL WOUND DEHISCENCE, SUBSEQUENT ENCOUNTER: ICD-10-CM

## 2025-07-03 DIAGNOSIS — S92.252P: Primary | ICD-10-CM

## 2025-07-03 LAB — BACTERIA SPEC ANAEROBE CULT: NORMAL

## 2025-07-03 RX ORDER — SULFAMETHOXAZOLE AND TRIMETHOPRIM 800; 160 MG/1; MG/1
1 TABLET ORAL 2 TIMES DAILY
Qty: 20 TABLET | Refills: 0 | Status: SHIPPED | OUTPATIENT
Start: 2025-07-03 | End: 2025-07-13

## 2025-07-04 LAB — BACTERIA SPEC AEROBE CULT: ABNORMAL

## 2025-07-21 ENCOUNTER — PATIENT MESSAGE (OUTPATIENT)
Dept: RESEARCH | Facility: HOSPITAL | Age: 25
End: 2025-07-21
Payer: MEDICAID

## (undated) DEVICE — GLOVE BIOGEL ORTHOPEDIC 7.5

## (undated) DEVICE — DRAPE U SPLIT SHEET 54X76IN

## (undated) DEVICE — BANDAGE MATRIX HK LOOP 4IN 5YD

## (undated) DEVICE — BLADE SURG #15 CARBON STEEL

## (undated) DEVICE — SYR 10CC LUER LOCK

## (undated) DEVICE — BANDAGE ELASTIC 3X5 VELCRO ST

## (undated) DEVICE — SPONGE COTTON TRAY 4X4IN

## (undated) DEVICE — DRAPE THREE-QUARTER 53X77IN

## (undated) DEVICE — BANDAGE ESMARK ELASTIC ST 4X9

## (undated) DEVICE — NDL BLNT STD 1.5IN 18G

## (undated) DEVICE — ALCOHOL 70% ANTISEPTIC ISO 4OZ

## (undated) DEVICE — SUT VICRYL CTD 2-0 GI 27 SH

## (undated) DEVICE — C-WIRE PAK DOUBLE ENDED ORTHOPAEDIC WIRE, TROCAR, .045" (1.14 MM)
Type: IMPLANTABLE DEVICE | Site: HAND | Status: NON-FUNCTIONAL
Brand: C-WIRE
Removed: 2024-10-24

## (undated) DEVICE — GAUZE CNFRM STRL 4INX4.1YD

## (undated) DEVICE — ELECTRODE REM PLYHSV RETURN 9

## (undated) DEVICE — Device

## (undated) DEVICE — SUT 4-0 ETHILON 18 PS-2

## (undated) DEVICE — UNDERGLOVES BIOGEL PI SZ 7 LF

## (undated) DEVICE — KIT TURNOVER

## (undated) DEVICE — CONTAINER SPECIMEN OR STER 4OZ

## (undated) DEVICE — BOOT WALKER ROCKER MEDIUM

## (undated) DEVICE — SCRUB DYNA-HEX LIQ 4% CHG 4OZ

## (undated) DEVICE — UNDERPAD DELUXE FLUFF 30X30IN

## (undated) DEVICE — MANIFOLD 4 PORT

## (undated) DEVICE — WIRE C TROCAR TIP .062: Type: IMPLANTABLE DEVICE | Status: NON-FUNCTIONAL

## (undated) DEVICE — BLADE SURG CARBON STEEL #10

## (undated) DEVICE — APPLICATOR CHLORAPREP ORN 26ML

## (undated) DEVICE — SUT MONOCRYL 3-0 RB1

## (undated) DEVICE — NDL ECLIPSE SAFETY 18GX1-1/2IN

## (undated) DEVICE — GOWN POLY REINF BRTH SLV XL

## (undated) DEVICE — DRAPE T EXTRM SURG 121X128X90

## (undated) DEVICE — DRAPE MINI C-ARM

## (undated) DEVICE — IMPLANTABLE DEVICE
Type: IMPLANTABLE DEVICE | Site: FOOT | Status: NON-FUNCTIONAL
Removed: 2025-06-13

## (undated) DEVICE — C-WIRE PAK DOUBLE ENDED ORTHOPAEDIC WIRE, SPADE, .062" (1.57 MM)
Type: IMPLANTABLE DEVICE | Site: FOOT | Status: NON-FUNCTIONAL
Brand: C-WIRE
Removed: 2025-04-11

## (undated) DEVICE — PAD ABDOMINAL STERILE 8X10IN

## (undated) DEVICE — PAD CAST SPECIALIST STRL 4

## (undated) DEVICE — PAD CAST SPECIALIST STRL 6

## (undated) DEVICE — DRAPE INVISISHIELD TWL 18X24IN

## (undated) DEVICE — DRESSING GAUZE OIL EMUL 3X8

## (undated) DEVICE — PACK BASIC SETUP SC BR

## (undated) DEVICE — DRAPE THREE-QTR REINF 53X77IN

## (undated) DEVICE — BLADE SCALP OPHTL RND TIP

## (undated) DEVICE — BANDAGE MATRIX HK LOOP 6IN 5YD

## (undated) DEVICE — COVER LIGHT HANDLE 80/CA

## (undated) DEVICE — GLOVE SENSICARE PI GRN 8.5

## (undated) DEVICE — SUT ETHILON 3-0 PS2 18 BLK

## (undated) DEVICE — DRAPE MOBILE C-ARM

## (undated) DEVICE — POSITIONER HEAD DONUT 9IN FOAM

## (undated) DEVICE — SOL NACL 0.9% IV INJ 1000ML

## (undated) DEVICE — BANDAGE ROLL COTTN 4.5INX4.1YD

## (undated) DEVICE — CORD CAUTERY BIPOLAR STERILE

## (undated) DEVICE — PAD CAST SPECIALIST STRL 3

## (undated) DEVICE — SOL NACL IRR 1000ML BTL

## (undated) DEVICE — YANKAUER FLEX NO VENT REG CAP

## (undated) DEVICE — HEADREST ROUND DISP FOAM 9IN

## (undated) DEVICE — SUT VICRYL 3-0 27 SH

## (undated) DEVICE — COVER CAMERA OPERATING ROOM

## (undated) DEVICE — KWIRE SMTH TRCR TIP 1.2X150MM
Type: IMPLANTABLE DEVICE | Site: FOOT | Status: NON-FUNCTIONAL
Removed: 2025-06-13

## (undated) DEVICE — SUT 2-0 MONOCRYL PLUS SH UD

## (undated) DEVICE — STRAP ARMBOARD DISP 1.5X32IN

## (undated) DEVICE — SPONGE GAUZE 4X4 12 PLY STRL

## (undated) DEVICE — DRESSING N ADH OIL EMUL 3X3

## (undated) DEVICE — GLOVE SURGEONS ULTRA TOUCH 6.5

## (undated) DEVICE — DRESSING TELFA N ADH 3X8

## (undated) DEVICE — STOCKINET TUBULAR 1 PLY 6X60IN

## (undated) DEVICE — BNDG COFLEX FOAM LF2 ST 3X5YD

## (undated) DEVICE — TOURNIQUET SB QC DP 24X4IN

## (undated) DEVICE — NDL ECLIPSE SAF REG 25GX1.5IN

## (undated) DEVICE — GLOVE SENSICARE PI GRN 8

## (undated) DEVICE — BURR CARBIDE OVAL STERILE 4MM

## (undated) DEVICE — GOWN FAB REINF SET-IN SLV LG

## (undated) DEVICE — BANDAGE GAUZE CONF STRL 6X4.1Y

## (undated) DEVICE — DRAPE HAND STERILE

## (undated) DEVICE — GLOVE BIOGEL SZ 8 1/2

## (undated) DEVICE — TUBING MEDI-VAC 20FT .25IN

## (undated) DEVICE — DRAPE C-ARMOR EQUIPMENT COVER

## (undated) DEVICE — DRAPE STERI-DRAPE 1000 17X11IN

## (undated) DEVICE — NDL SAFETY 22G X 1.5 ECLIPSE

## (undated) DEVICE — SUT 2-0 VICRYL FS-1 UND

## (undated) DEVICE — SLING ARM BUCKLE CLOSURE LG

## (undated) DEVICE — BANDAGE MATRIX HK LOOP 3IN 5YD

## (undated) DEVICE — GLOVE 8 PROTEXIS PI ORTHO

## (undated) DEVICE — TOWEL OR DISP STRL BLUE 4/PK

## (undated) DEVICE — SYR 3CC LUER LOC

## (undated) DEVICE — SUT 3-0 MONOCRYL PLUS PS-2

## (undated) DEVICE — DRESSING GAUZE XEROFORM 5X9

## (undated) DEVICE — SUPPORT ULNA NERVE PROTECTOR